# Patient Record
Sex: MALE | Race: BLACK OR AFRICAN AMERICAN | NOT HISPANIC OR LATINO | Employment: OTHER | URBAN - NONMETROPOLITAN AREA
[De-identification: names, ages, dates, MRNs, and addresses within clinical notes are randomized per-mention and may not be internally consistent; named-entity substitution may affect disease eponyms.]

---

## 2017-05-19 ENCOUNTER — APPOINTMENT (OUTPATIENT)
Dept: LAB | Facility: HOSPITAL | Age: 37
End: 2017-05-19

## 2017-05-19 ENCOUNTER — OFFICE VISIT (OUTPATIENT)
Dept: FAMILY MEDICINE CLINIC | Facility: CLINIC | Age: 37
End: 2017-05-19

## 2017-05-19 VITALS
HEIGHT: 73 IN | SYSTOLIC BLOOD PRESSURE: 128 MMHG | HEART RATE: 98 BPM | DIASTOLIC BLOOD PRESSURE: 86 MMHG | WEIGHT: 208.6 LBS | BODY MASS INDEX: 27.65 KG/M2 | TEMPERATURE: 98.1 F | OXYGEN SATURATION: 98 %

## 2017-05-19 DIAGNOSIS — F17.200 TOBACCO DEPENDENCE SYNDROME: Chronic | ICD-10-CM

## 2017-05-19 DIAGNOSIS — R19.7 DIARRHEA, UNSPECIFIED TYPE: ICD-10-CM

## 2017-05-19 DIAGNOSIS — R11.0 NAUSEA: ICD-10-CM

## 2017-05-19 DIAGNOSIS — F20.9 CHRONIC UNDIFFERENTIATED SCHIZOPHRENIA (HCC): Chronic | ICD-10-CM

## 2017-05-19 DIAGNOSIS — F31.9 BIPOLAR AFFECTIVE DISORDER, REMISSION STATUS UNSPECIFIED (HCC): Primary | Chronic | ICD-10-CM

## 2017-05-19 DIAGNOSIS — R10.84 GENERALIZED ABDOMINAL PAIN: ICD-10-CM

## 2017-05-19 LAB
25(OH)D3 SERPL-MCNC: 22 NG/ML (ref 30–100)
ALBUMIN SERPL-MCNC: 4.1 G/DL (ref 3.4–4.8)
ALBUMIN/GLOB SERPL: 1.1 G/DL (ref 1.1–1.8)
ALP SERPL-CCNC: 62 U/L (ref 38–126)
ALT SERPL W P-5'-P-CCNC: 29 U/L (ref 21–72)
AMPHET+METHAMPHET UR QL: NEGATIVE
ANION GAP SERPL CALCULATED.3IONS-SCNC: 8 MMOL/L (ref 5–15)
AST SERPL-CCNC: 40 U/L (ref 17–59)
BARBITURATES UR QL SCN: NEGATIVE
BENZODIAZ UR QL SCN: NEGATIVE
BILIRUB SERPL-MCNC: 0.3 MG/DL (ref 0.2–1.3)
BILIRUB UR QL STRIP: ABNORMAL
BUN BLD-MCNC: 12 MG/DL (ref 7–21)
BUN/CREAT SERPL: 11.2 (ref 7–25)
CALCIUM SPEC-SCNC: 9.6 MG/DL (ref 8.4–10.2)
CANNABINOIDS SERPL QL: NEGATIVE
CHLORIDE SERPL-SCNC: 100 MMOL/L (ref 95–110)
CLARITY UR: CLEAR
CO2 SERPL-SCNC: 29 MMOL/L (ref 22–31)
COCAINE UR QL: NEGATIVE
COLOR UR: ABNORMAL
CREAT BLD-MCNC: 1.07 MG/DL (ref 0.7–1.3)
DEPRECATED RDW RBC AUTO: 48.9 FL (ref 35.1–43.9)
ERYTHROCYTE [DISTWIDTH] IN BLOOD BY AUTOMATED COUNT: 15.3 % (ref 11.5–14.5)
GFR SERPL CREATININE-BSD FRML MDRD: 94 ML/MIN/1.73 (ref 70–162)
GLOBULIN UR ELPH-MCNC: 3.9 GM/DL (ref 2.3–3.5)
GLUCOSE BLD-MCNC: 95 MG/DL (ref 60–100)
GLUCOSE UR STRIP-MCNC: NEGATIVE MG/DL
HAV IGM SERPL QL IA: NEGATIVE
HBV CORE IGM SERPL QL IA: NEGATIVE
HBV SURFACE AG SERPL QL IA: NEGATIVE
HCT VFR BLD AUTO: 39.9 % (ref 39–49)
HCV AB SER DONR QL: NEGATIVE
HGB BLD-MCNC: 13.3 G/DL (ref 13.7–17.3)
HGB UR QL STRIP.AUTO: NEGATIVE
KETONES UR QL STRIP: ABNORMAL
LEUKOCYTE ESTERASE UR QL STRIP.AUTO: NEGATIVE
MAGNESIUM SERPL-MCNC: 2.2 MG/DL (ref 1.6–2.3)
MCH RBC QN AUTO: 29.4 PG (ref 26.5–34)
MCHC RBC AUTO-ENTMCNC: 33.3 G/DL (ref 31.5–36.3)
MCV RBC AUTO: 88.3 FL (ref 80–98)
METHADONE UR QL SCN: NEGATIVE
NITRITE UR QL STRIP: NEGATIVE
OPIATES UR QL: NEGATIVE
OXYCODONE UR QL SCN: NEGATIVE
PH UR STRIP.AUTO: 7 [PH] (ref 5–9)
PLATELET # BLD AUTO: 345 10*3/MM3 (ref 150–450)
PMV BLD AUTO: 9.5 FL (ref 8–12)
POTASSIUM BLD-SCNC: 4.5 MMOL/L (ref 3.5–5.1)
PROT SERPL-MCNC: 8 G/DL (ref 6.3–8.6)
PROT UR QL STRIP: ABNORMAL
RBC # BLD AUTO: 4.52 10*6/MM3 (ref 4.37–5.74)
SODIUM BLD-SCNC: 137 MMOL/L (ref 137–145)
SP GR UR STRIP: 1.03 (ref 1–1.03)
UROBILINOGEN UR QL STRIP: ABNORMAL
WBC NRBC COR # BLD: 13.52 10*3/MM3 (ref 3.2–9.8)

## 2017-05-19 PROCEDURE — 80307 DRUG TEST PRSMV CHEM ANLYZR: CPT | Performed by: FAMILY MEDICINE

## 2017-05-19 PROCEDURE — 81003 URINALYSIS AUTO W/O SCOPE: CPT | Performed by: FAMILY MEDICINE

## 2017-05-19 PROCEDURE — 80053 COMPREHEN METABOLIC PANEL: CPT | Performed by: FAMILY MEDICINE

## 2017-05-19 PROCEDURE — 85027 COMPLETE CBC AUTOMATED: CPT | Performed by: FAMILY MEDICINE

## 2017-05-19 PROCEDURE — 83735 ASSAY OF MAGNESIUM: CPT | Performed by: FAMILY MEDICINE

## 2017-05-19 PROCEDURE — 80074 ACUTE HEPATITIS PANEL: CPT | Performed by: FAMILY MEDICINE

## 2017-05-19 PROCEDURE — 99214 OFFICE O/P EST MOD 30 MIN: CPT | Performed by: FAMILY MEDICINE

## 2017-05-19 PROCEDURE — 36415 COLL VENOUS BLD VENIPUNCTURE: CPT | Performed by: FAMILY MEDICINE

## 2017-05-19 PROCEDURE — 82306 VITAMIN D 25 HYDROXY: CPT | Performed by: FAMILY MEDICINE

## 2017-06-02 ENCOUNTER — OFFICE VISIT (OUTPATIENT)
Dept: FAMILY MEDICINE CLINIC | Facility: CLINIC | Age: 37
End: 2017-06-02

## 2017-06-02 ENCOUNTER — HOSPITAL ENCOUNTER (OUTPATIENT)
Dept: ULTRASOUND IMAGING | Facility: HOSPITAL | Age: 37
Discharge: HOME | End: 2017-06-02
Admitting: FAMILY MEDICINE

## 2017-06-02 VITALS
HEIGHT: 73 IN | BODY MASS INDEX: 27.57 KG/M2 | WEIGHT: 208 LBS | DIASTOLIC BLOOD PRESSURE: 80 MMHG | SYSTOLIC BLOOD PRESSURE: 118 MMHG

## 2017-06-02 DIAGNOSIS — R10.13 DYSPEPSIA: Primary | ICD-10-CM

## 2017-06-02 DIAGNOSIS — R10.84 GENERALIZED ABDOMINAL PAIN: ICD-10-CM

## 2017-06-02 DIAGNOSIS — R11.0 NAUSEA: ICD-10-CM

## 2017-06-02 PROCEDURE — 76705 ECHO EXAM OF ABDOMEN: CPT

## 2017-06-02 PROCEDURE — 99213 OFFICE O/P EST LOW 20 MIN: CPT | Performed by: FAMILY MEDICINE

## 2017-06-02 RX ORDER — PANTOPRAZOLE SODIUM 40 MG/1
40 TABLET, DELAYED RELEASE ORAL DAILY
Qty: 30 TABLET | Refills: 1 | Status: SHIPPED | OUTPATIENT
Start: 2017-06-02 | End: 2018-04-20

## 2017-06-02 NOTE — PROGRESS NOTES
Subjective   Erickson Singh is a 36 y.o. male.     History of Present Illness reevaluation following studies.  All studies including ultrasound normal.  Suspect an element of functional bowel with gastritis plus minus a food intolerance.  Have counseled on cutting back stopping smoking getting back on caffeine normal diet four-week trial of proton pump blocker.  Counseled on same.    The following portions of the patient's history were reviewed and updated as appropriate: allergies, current medications, past family history, past medical history, past social history, past surgical history and problem list.    Review of Systems   Constitutional: Negative for activity change, appetite change, fatigue and unexpected weight change.   HENT: Negative for trouble swallowing and voice change.    Eyes: Negative for redness and visual disturbance.   Respiratory: Negative for cough and wheezing.    Cardiovascular: Negative for chest pain and palpitations.   Gastrointestinal: Negative for abdominal pain, constipation, diarrhea, nausea and vomiting.   Genitourinary: Negative for urgency.   Musculoskeletal: Negative for joint swelling.   Neurological: Negative for syncope and headaches.   Hematological: Negative for adenopathy.   Psychiatric/Behavioral: Negative for sleep disturbance.       Objective   Physical Exam   Constitutional: He appears well-developed.   HENT:   Head: Normocephalic.   Eyes: Pupils are equal, round, and reactive to light.   Neck: Normal range of motion.   Cardiovascular: Normal rate.    Pulmonary/Chest: Effort normal.   Abdominal: Soft.   Musculoskeletal: Normal range of motion.   Neurological: He is alert.   Skin: Skin is warm.   Psychiatric: He has a normal mood and affect.       Assessment/Plan   Erickson was seen today for follow-up.    Diagnoses and all orders for this visit:    Dyspepsia  -     pantoprazole (PROTONIX) 40 MG EC tablet; Take 1 tablet by mouth Daily. x4wks for stomach

## 2017-07-12 ENCOUNTER — TRANSCRIBE ORDERS (OUTPATIENT)
Dept: LAB | Facility: HOSPITAL | Age: 37
End: 2017-07-12

## 2017-07-12 DIAGNOSIS — F20.9 UNSPECIFIED SCHIZOPHRENIA, SUBCHRONIC CONDITION: Primary | ICD-10-CM

## 2017-07-13 ENCOUNTER — LAB (OUTPATIENT)
Dept: LAB | Facility: HOSPITAL | Age: 37
End: 2017-07-13

## 2017-07-13 DIAGNOSIS — F20.9 UNSPECIFIED SCHIZOPHRENIA, SUBCHRONIC CONDITION: ICD-10-CM

## 2017-07-13 LAB
ALBUMIN SERPL-MCNC: 3.9 G/DL (ref 3.4–4.8)
ALBUMIN/GLOB SERPL: 1.2 G/DL (ref 1.1–1.8)
ALP SERPL-CCNC: 67 U/L (ref 38–126)
ALT SERPL W P-5'-P-CCNC: 33 U/L (ref 21–72)
ANION GAP SERPL CALCULATED.3IONS-SCNC: 9 MMOL/L (ref 5–15)
AST SERPL-CCNC: 23 U/L (ref 17–59)
BASOPHILS # BLD AUTO: 0.02 10*3/MM3 (ref 0–0.2)
BASOPHILS NFR BLD AUTO: 0.2 % (ref 0–2)
BILIRUB SERPL-MCNC: 0.3 MG/DL (ref 0.2–1.3)
BUN BLD-MCNC: 12 MG/DL (ref 7–21)
BUN/CREAT SERPL: 10.6 (ref 7–25)
CALCIUM SPEC-SCNC: 9.3 MG/DL (ref 8.4–10.2)
CHLORIDE SERPL-SCNC: 103 MMOL/L (ref 95–110)
CO2 SERPL-SCNC: 27 MMOL/L (ref 22–31)
CREAT BLD-MCNC: 1.13 MG/DL (ref 0.7–1.3)
DEPRECATED RDW RBC AUTO: 50.8 FL (ref 35.1–43.9)
EOSINOPHIL # BLD AUTO: 0.18 10*3/MM3 (ref 0–0.7)
EOSINOPHIL NFR BLD AUTO: 2 % (ref 0–7)
ERYTHROCYTE [DISTWIDTH] IN BLOOD BY AUTOMATED COUNT: 15.2 % (ref 11.5–14.5)
GFR SERPL CREATININE-BSD FRML MDRD: 88 ML/MIN/1.73 (ref 70–162)
GLOBULIN UR ELPH-MCNC: 3.2 GM/DL (ref 2.3–3.5)
GLUCOSE BLD-MCNC: 86 MG/DL (ref 60–100)
HCT VFR BLD AUTO: 40.9 % (ref 39–49)
HGB BLD-MCNC: 13.1 G/DL (ref 13.7–17.3)
IMM GRANULOCYTES # BLD: 0.04 10*3/MM3 (ref 0–0.02)
IMM GRANULOCYTES NFR BLD: 0.4 % (ref 0–0.5)
LYMPHOCYTES # BLD AUTO: 2.48 10*3/MM3 (ref 0.6–4.2)
LYMPHOCYTES NFR BLD AUTO: 27.4 % (ref 10–50)
MCH RBC QN AUTO: 29.2 PG (ref 26.5–34)
MCHC RBC AUTO-ENTMCNC: 32 G/DL (ref 31.5–36.3)
MCV RBC AUTO: 91.3 FL (ref 80–98)
MONOCYTES # BLD AUTO: 1.22 10*3/MM3 (ref 0–0.9)
MONOCYTES NFR BLD AUTO: 13.5 % (ref 0–12)
NEUTROPHILS # BLD AUTO: 5.11 10*3/MM3 (ref 2–8.6)
NEUTROPHILS NFR BLD AUTO: 56.5 % (ref 37–80)
PLATELET # BLD AUTO: 249 10*3/MM3 (ref 150–450)
PMV BLD AUTO: 10.1 FL (ref 8–12)
POTASSIUM BLD-SCNC: 4.9 MMOL/L (ref 3.5–5.1)
PROT SERPL-MCNC: 7.1 G/DL (ref 6.3–8.6)
RBC # BLD AUTO: 4.48 10*6/MM3 (ref 4.37–5.74)
SODIUM BLD-SCNC: 139 MMOL/L (ref 137–145)
T3 SERPL-MCNC: 124 NG/DL (ref 97–169)
T4 FREE SERPL-MCNC: 0.95 NG/DL (ref 0.78–2.19)
TSH SERPL DL<=0.05 MIU/L-ACNC: 2.41 MIU/ML (ref 0.46–4.68)
VALPROATE SERPL-MCNC: 120.9 MCG/ML (ref 50–120)
WBC NRBC COR # BLD: 9.05 10*3/MM3 (ref 3.2–9.8)

## 2017-07-13 PROCEDURE — 80053 COMPREHEN METABOLIC PANEL: CPT | Performed by: NURSE PRACTITIONER

## 2017-07-13 PROCEDURE — 84480 ASSAY TRIIODOTHYRONINE (T3): CPT | Performed by: NURSE PRACTITIONER

## 2017-07-13 PROCEDURE — 84439 ASSAY OF FREE THYROXINE: CPT | Performed by: NURSE PRACTITIONER

## 2017-07-13 PROCEDURE — 84443 ASSAY THYROID STIM HORMONE: CPT | Performed by: NURSE PRACTITIONER

## 2017-07-13 PROCEDURE — 85025 COMPLETE CBC W/AUTO DIFF WBC: CPT | Performed by: NURSE PRACTITIONER

## 2017-07-13 PROCEDURE — 36415 COLL VENOUS BLD VENIPUNCTURE: CPT

## 2017-07-13 PROCEDURE — 80164 ASSAY DIPROPYLACETIC ACD TOT: CPT | Performed by: NURSE PRACTITIONER

## 2017-09-12 ENCOUNTER — LAB (OUTPATIENT)
Dept: LAB | Facility: HOSPITAL | Age: 37
End: 2017-09-12

## 2017-09-12 ENCOUNTER — TRANSCRIBE ORDERS (OUTPATIENT)
Dept: LAB | Facility: HOSPITAL | Age: 37
End: 2017-09-12

## 2017-09-12 DIAGNOSIS — Z79.899 ENCOUNTER FOR LONG-TERM (CURRENT) USE OF MEDICATIONS: Primary | ICD-10-CM

## 2017-09-12 DIAGNOSIS — Z79.899 ENCOUNTER FOR LONG-TERM (CURRENT) USE OF MEDICATIONS: ICD-10-CM

## 2017-09-12 LAB
25(OH)D3 SERPL-MCNC: 28.5 NG/ML (ref 30–100)
ALBUMIN SERPL-MCNC: 4.2 G/DL (ref 3.4–4.8)
ALBUMIN/GLOB SERPL: 1.3 G/DL (ref 1.1–1.8)
ALP SERPL-CCNC: 60 U/L (ref 38–126)
ALT SERPL W P-5'-P-CCNC: 39 U/L (ref 21–72)
AMPHET+METHAMPHET UR QL: NEGATIVE
ANION GAP SERPL CALCULATED.3IONS-SCNC: 9 MMOL/L (ref 5–15)
AST SERPL-CCNC: 40 U/L (ref 17–59)
BARBITURATES UR QL SCN: NEGATIVE
BASOPHILS # BLD AUTO: 0.02 10*3/MM3 (ref 0–0.2)
BASOPHILS NFR BLD AUTO: 0.2 % (ref 0–2)
BENZODIAZ UR QL SCN: NEGATIVE
BILIRUB SERPL-MCNC: 0.4 MG/DL (ref 0.2–1.3)
BUN BLD-MCNC: 8 MG/DL (ref 7–21)
BUN/CREAT SERPL: 7.6 (ref 7–25)
CALCIUM SPEC-SCNC: 9.6 MG/DL (ref 8.4–10.2)
CANNABINOIDS SERPL QL: NEGATIVE
CHLORIDE SERPL-SCNC: 103 MMOL/L (ref 95–110)
CO2 SERPL-SCNC: 26 MMOL/L (ref 22–31)
COCAINE UR QL: NEGATIVE
CREAT BLD-MCNC: 1.05 MG/DL (ref 0.7–1.3)
DEPRECATED RDW RBC AUTO: 48.5 FL (ref 35.1–43.9)
EOSINOPHIL # BLD AUTO: 0.14 10*3/MM3 (ref 0–0.7)
EOSINOPHIL NFR BLD AUTO: 1.6 % (ref 0–7)
ERYTHROCYTE [DISTWIDTH] IN BLOOD BY AUTOMATED COUNT: 14.9 % (ref 11.5–14.5)
GFR SERPL CREATININE-BSD FRML MDRD: 96 ML/MIN/1.73 (ref 70–162)
GLOBULIN UR ELPH-MCNC: 3.2 GM/DL (ref 2.3–3.5)
GLUCOSE BLD-MCNC: 107 MG/DL (ref 60–100)
HCT VFR BLD AUTO: 41.7 % (ref 39–49)
HGB BLD-MCNC: 14.3 G/DL (ref 13.7–17.3)
IMM GRANULOCYTES # BLD: 0.04 10*3/MM3 (ref 0–0.02)
IMM GRANULOCYTES NFR BLD: 0.5 % (ref 0–0.5)
LYMPHOCYTES # BLD AUTO: 2.12 10*3/MM3 (ref 0.6–4.2)
LYMPHOCYTES NFR BLD AUTO: 23.9 % (ref 10–50)
MCH RBC QN AUTO: 30.4 PG (ref 26.5–34)
MCHC RBC AUTO-ENTMCNC: 34.3 G/DL (ref 31.5–36.3)
MCV RBC AUTO: 88.5 FL (ref 80–98)
METHADONE UR QL SCN: NEGATIVE
MONOCYTES # BLD AUTO: 1.08 10*3/MM3 (ref 0–0.9)
MONOCYTES NFR BLD AUTO: 12.2 % (ref 0–12)
NEUTROPHILS # BLD AUTO: 5.47 10*3/MM3 (ref 2–8.6)
NEUTROPHILS NFR BLD AUTO: 61.6 % (ref 37–80)
NRBC BLD MANUAL-RTO: 0 /100 WBC (ref 0–0)
OPIATES UR QL: NEGATIVE
OXYCODONE UR QL SCN: NEGATIVE
PLATELET # BLD AUTO: 302 10*3/MM3 (ref 150–450)
PMV BLD AUTO: 9.9 FL (ref 8–12)
POTASSIUM BLD-SCNC: 4.2 MMOL/L (ref 3.5–5.1)
PROT SERPL-MCNC: 7.4 G/DL (ref 6.3–8.6)
RBC # BLD AUTO: 4.71 10*6/MM3 (ref 4.37–5.74)
SODIUM BLD-SCNC: 138 MMOL/L (ref 137–145)
T4 FREE SERPL-MCNC: 1 NG/DL (ref 0.78–2.19)
TSH SERPL DL<=0.05 MIU/L-ACNC: 2.22 MIU/ML (ref 0.46–4.68)
VALPROATE SERPL-MCNC: 76.4 MCG/ML (ref 50–120)
VIT B12 BLD-MCNC: 820 PG/ML (ref 239–931)
WBC NRBC COR # BLD: 8.87 10*3/MM3 (ref 3.2–9.8)

## 2017-09-12 PROCEDURE — 36415 COLL VENOUS BLD VENIPUNCTURE: CPT

## 2017-09-12 PROCEDURE — 80307 DRUG TEST PRSMV CHEM ANLYZR: CPT | Performed by: NURSE PRACTITIONER

## 2017-09-12 PROCEDURE — 85025 COMPLETE CBC W/AUTO DIFF WBC: CPT | Performed by: NURSE PRACTITIONER

## 2017-09-12 PROCEDURE — 82306 VITAMIN D 25 HYDROXY: CPT | Performed by: NURSE PRACTITIONER

## 2017-09-12 PROCEDURE — 84481 FREE ASSAY (FT-3): CPT | Performed by: NURSE PRACTITIONER

## 2017-09-12 PROCEDURE — 80053 COMPREHEN METABOLIC PANEL: CPT | Performed by: NURSE PRACTITIONER

## 2017-09-12 PROCEDURE — 84443 ASSAY THYROID STIM HORMONE: CPT | Performed by: NURSE PRACTITIONER

## 2017-09-12 PROCEDURE — 84439 ASSAY OF FREE THYROXINE: CPT | Performed by: NURSE PRACTITIONER

## 2017-09-12 PROCEDURE — 82607 VITAMIN B-12: CPT | Performed by: NURSE PRACTITIONER

## 2017-09-12 PROCEDURE — 80164 ASSAY DIPROPYLACETIC ACD TOT: CPT | Performed by: NURSE PRACTITIONER

## 2017-09-13 LAB — T3FREE SERPL-MCNC: 3.4 PG/ML (ref 2–4.4)

## 2018-02-16 ENCOUNTER — TRANSCRIBE ORDERS (OUTPATIENT)
Dept: LAB | Facility: HOSPITAL | Age: 38
End: 2018-02-16

## 2018-02-16 ENCOUNTER — LAB (OUTPATIENT)
Dept: LAB | Facility: HOSPITAL | Age: 38
End: 2018-02-16

## 2018-02-16 DIAGNOSIS — F20.9 CHRONIC SCHIZOPHRENIA (HCC): ICD-10-CM

## 2018-02-16 DIAGNOSIS — F20.9 CHRONIC SCHIZOPHRENIA (HCC): Primary | ICD-10-CM

## 2018-02-16 LAB
25(OH)D3 SERPL-MCNC: 23.8 NG/ML (ref 30–100)
ALBUMIN SERPL-MCNC: 3.9 G/DL (ref 3.4–4.8)
ALBUMIN/GLOB SERPL: 1 G/DL (ref 1.1–1.8)
ALP SERPL-CCNC: 56 U/L (ref 38–126)
ALT SERPL W P-5'-P-CCNC: 23 U/L (ref 21–72)
ANION GAP SERPL CALCULATED.3IONS-SCNC: 12 MMOL/L (ref 5–15)
AST SERPL-CCNC: 40 U/L (ref 17–59)
BASOPHILS # BLD AUTO: 0.04 10*3/MM3 (ref 0–0.2)
BASOPHILS NFR BLD AUTO: 0.4 % (ref 0–2)
BILIRUB SERPL-MCNC: 0.2 MG/DL (ref 0.2–1.3)
BUN BLD-MCNC: 12 MG/DL (ref 7–21)
BUN/CREAT SERPL: 12.1 (ref 7–25)
CALCIUM SPEC-SCNC: 9.6 MG/DL (ref 8.4–10.2)
CHLORIDE SERPL-SCNC: 101 MMOL/L (ref 95–110)
CO2 SERPL-SCNC: 30 MMOL/L (ref 22–31)
CREAT BLD-MCNC: 0.99 MG/DL (ref 0.7–1.3)
DEPRECATED RDW RBC AUTO: 46.7 FL (ref 35.1–43.9)
EOSINOPHIL # BLD AUTO: 0.08 10*3/MM3 (ref 0–0.7)
EOSINOPHIL NFR BLD AUTO: 0.9 % (ref 0–7)
ERYTHROCYTE [DISTWIDTH] IN BLOOD BY AUTOMATED COUNT: 14.1 % (ref 11.5–14.5)
GFR SERPL CREATININE-BSD FRML MDRD: 103 ML/MIN/1.73 (ref 70–162)
GLOBULIN UR ELPH-MCNC: 3.8 GM/DL (ref 2.3–3.5)
GLUCOSE BLD-MCNC: 98 MG/DL (ref 60–100)
HCT VFR BLD AUTO: 40.6 % (ref 39–49)
HGB BLD-MCNC: 13.5 G/DL (ref 13.7–17.3)
IMM GRANULOCYTES # BLD: 0.03 10*3/MM3 (ref 0–0.02)
IMM GRANULOCYTES NFR BLD: 0.3 % (ref 0–0.5)
LYMPHOCYTES # BLD AUTO: 1.83 10*3/MM3 (ref 0.6–4.2)
LYMPHOCYTES NFR BLD AUTO: 19.9 % (ref 10–50)
MCH RBC QN AUTO: 29.7 PG (ref 26.5–34)
MCHC RBC AUTO-ENTMCNC: 33.3 G/DL (ref 31.5–36.3)
MCV RBC AUTO: 89.2 FL (ref 80–98)
MONOCYTES # BLD AUTO: 1.11 10*3/MM3 (ref 0–0.9)
MONOCYTES NFR BLD AUTO: 12.1 % (ref 0–12)
NEUTROPHILS # BLD AUTO: 6.11 10*3/MM3 (ref 2–8.6)
NEUTROPHILS NFR BLD AUTO: 66.4 % (ref 37–80)
PLATELET # BLD AUTO: 267 10*3/MM3 (ref 150–450)
PMV BLD AUTO: 9.9 FL (ref 8–12)
POTASSIUM BLD-SCNC: 5.1 MMOL/L (ref 3.5–5.1)
PROT SERPL-MCNC: 7.7 G/DL (ref 6.3–8.6)
RBC # BLD AUTO: 4.55 10*6/MM3 (ref 4.37–5.74)
SODIUM BLD-SCNC: 143 MMOL/L (ref 137–145)
T3 SERPL-MCNC: 138 NG/DL (ref 97–169)
T4 FREE SERPL-MCNC: 1.06 NG/DL (ref 0.78–2.19)
TSH SERPL DL<=0.05 MIU/L-ACNC: 1.92 MIU/ML (ref 0.46–4.68)
VALPROATE SERPL-MCNC: 64.7 MCG/ML (ref 50–120)
WBC NRBC COR # BLD: 9.2 10*3/MM3 (ref 3.2–9.8)

## 2018-02-16 PROCEDURE — 85025 COMPLETE CBC W/AUTO DIFF WBC: CPT

## 2018-02-16 PROCEDURE — 84439 ASSAY OF FREE THYROXINE: CPT

## 2018-02-16 PROCEDURE — 84480 ASSAY TRIIODOTHYRONINE (T3): CPT

## 2018-02-16 PROCEDURE — 82306 VITAMIN D 25 HYDROXY: CPT

## 2018-02-16 PROCEDURE — 80164 ASSAY DIPROPYLACETIC ACD TOT: CPT

## 2018-02-16 PROCEDURE — 84443 ASSAY THYROID STIM HORMONE: CPT

## 2018-02-16 PROCEDURE — 80053 COMPREHEN METABOLIC PANEL: CPT

## 2018-02-16 PROCEDURE — 36415 COLL VENOUS BLD VENIPUNCTURE: CPT

## 2018-04-20 ENCOUNTER — OFFICE VISIT (OUTPATIENT)
Dept: FAMILY MEDICINE CLINIC | Facility: CLINIC | Age: 38
End: 2018-04-20

## 2018-04-20 VITALS
WEIGHT: 205 LBS | DIASTOLIC BLOOD PRESSURE: 78 MMHG | SYSTOLIC BLOOD PRESSURE: 120 MMHG | HEIGHT: 73 IN | BODY MASS INDEX: 27.17 KG/M2

## 2018-04-20 DIAGNOSIS — F20.9 CHRONIC UNDIFFERENTIATED SCHIZOPHRENIA (HCC): Primary | Chronic | ICD-10-CM

## 2018-04-20 DIAGNOSIS — F17.200 TOBACCO DEPENDENCE SYNDROME: Chronic | ICD-10-CM

## 2018-04-20 DIAGNOSIS — Z87.820 SIGNIFICANT CLOSED HEAD TRAUMA WITHIN PAST 3 MONTHS: ICD-10-CM

## 2018-04-20 DIAGNOSIS — F31.9 BIPOLAR AFFECTIVE DISORDER, REMISSION STATUS UNSPECIFIED (HCC): Chronic | ICD-10-CM

## 2018-04-20 PROCEDURE — 99214 OFFICE O/P EST MOD 30 MIN: CPT | Performed by: FAMILY MEDICINE

## 2018-04-20 RX ORDER — PENICILLIN V POTASSIUM 500 MG/1
500 TABLET ORAL
Refills: 0 | COMMUNITY
Start: 2018-04-08 | End: 2018-06-22

## 2018-04-20 NOTE — PROGRESS NOTES
Subjective   Erickson Singh is a 37 y.o. male.     History of Present Illness   other request evaluation of status post head trauma.  Mother states patient fell about 3 weeks ago striking the back of his head.  As patient states been more lethargic since no seizure activity over no real loss of consciousness.  Given patient's schizophrenia is difficult to get a straight history from patient.  Has had recent lab work done that was normal however.  Previous history noted.    The following portions of the patient's history were reviewed and updated as appropriate: allergies, current medications, past family history, past medical history, past social history, past surgical history and problem list.    Review of Systems   Constitutional: Positive for fatigue. Negative for activity change, appetite change and unexpected weight change.   HENT: Negative for trouble swallowing and voice change.    Eyes: Negative for redness and visual disturbance.   Respiratory: Negative for cough and wheezing.    Cardiovascular: Negative for chest pain and palpitations.   Gastrointestinal: Negative for abdominal pain, constipation, diarrhea, nausea and vomiting.   Genitourinary: Negative for urgency.   Musculoskeletal: Negative for joint swelling.   Neurological: Negative for syncope and headaches.   Hematological: Negative for adenopathy.   Psychiatric/Behavioral: Positive for decreased concentration and sleep disturbance.       Objective   Physical Exam   Constitutional: He appears well-developed.   HENT:   Head: Normocephalic and atraumatic.   Right Ear: External ear normal.   Left Ear: External ear normal.   Nose: Nose normal.   Mouth/Throat: Oropharynx is clear and moist.   Eyes: Pupils are equal, round, and reactive to light.   Neck: Normal range of motion. Neck supple.   Cardiovascular: Normal rate and regular rhythm.    Pulmonary/Chest: Effort normal.   Abdominal: Soft. Bowel sounds are normal.   Musculoskeletal: Normal  range of motion.   Neurological: He is alert. He has normal reflexes. He displays normal reflexes. No cranial nerve deficit. He exhibits normal muscle tone. Coordination normal.   Skin: Skin is warm and dry.   Psychiatric: His affect is blunt. His speech is delayed. He is slowed and withdrawn. Cognition and memory are impaired.       Assessment/Plan   Erickson was seen today for thyroid problem.    Diagnoses and all orders for this visit:    Chronic undifferentiated schizophrenia    Bipolar affective disorder, remission status unspecified    Tobacco dependence syndrome    Significant closed head trauma within past 3 months  -     CT Head Without Contrast; Future        Suspect grade 1 to concussion.  Unfortunately his symptoms are also masked by his psychotropic medications.  Mother states some element of possible sedation from same and referred back to mental health professional for same.  Given questionable history we'll get a CT scan just to look for any hidden trauma otherwise counseled on preventative measures recheck as directed

## 2018-05-04 ENCOUNTER — HOSPITAL ENCOUNTER (OUTPATIENT)
Dept: CT IMAGING | Facility: HOSPITAL | Age: 38
Discharge: HOME | End: 2018-05-04
Admitting: FAMILY MEDICINE

## 2018-05-04 DIAGNOSIS — Z87.820 SIGNIFICANT CLOSED HEAD TRAUMA WITHIN PAST 3 MONTHS: ICD-10-CM

## 2018-05-04 PROCEDURE — 70450 CT HEAD/BRAIN W/O DYE: CPT

## 2018-06-06 ENCOUNTER — APPOINTMENT (OUTPATIENT)
Dept: GENERAL RADIOLOGY | Facility: HOSPITAL | Age: 38
End: 2018-06-06

## 2018-06-06 ENCOUNTER — HOSPITAL ENCOUNTER (EMERGENCY)
Facility: HOSPITAL | Age: 38
Discharge: HOME | End: 2018-06-07
Attending: EMERGENCY MEDICINE | Admitting: EMERGENCY MEDICINE

## 2018-06-06 VITALS
HEIGHT: 74 IN | BODY MASS INDEX: 25.67 KG/M2 | HEART RATE: 60 BPM | OXYGEN SATURATION: 99 % | TEMPERATURE: 99.2 F | DIASTOLIC BLOOD PRESSURE: 83 MMHG | RESPIRATION RATE: 18 BRPM | WEIGHT: 200 LBS | SYSTOLIC BLOOD PRESSURE: 130 MMHG

## 2018-06-06 DIAGNOSIS — R10.9 ABDOMINAL PAIN, UNSPECIFIED ABDOMINAL LOCATION: Primary | ICD-10-CM

## 2018-06-06 DIAGNOSIS — R11.0 NAUSEA: ICD-10-CM

## 2018-06-06 LAB
ALBUMIN SERPL-MCNC: 4.1 G/DL (ref 3.4–4.8)
ALBUMIN/GLOB SERPL: 1.3 G/DL (ref 1.1–1.8)
ALP SERPL-CCNC: 54 U/L (ref 38–126)
ALT SERPL W P-5'-P-CCNC: 26 U/L (ref 21–72)
AMPHET+METHAMPHET UR QL: NEGATIVE
ANION GAP SERPL CALCULATED.3IONS-SCNC: 9 MMOL/L (ref 5–15)
AST SERPL-CCNC: 25 U/L (ref 17–59)
BACTERIA UR QL AUTO: ABNORMAL /HPF
BARBITURATES UR QL SCN: NEGATIVE
BASOPHILS # BLD AUTO: 0.02 10*3/MM3 (ref 0–0.2)
BASOPHILS NFR BLD AUTO: 0.2 % (ref 0–2)
BENZODIAZ UR QL SCN: NEGATIVE
BILIRUB SERPL-MCNC: 0.3 MG/DL (ref 0.2–1.3)
BILIRUB UR QL STRIP: ABNORMAL
BUN BLD-MCNC: 11 MG/DL (ref 7–21)
BUN/CREAT SERPL: 10.7 (ref 7–25)
CALCIUM SPEC-SCNC: 9.7 MG/DL (ref 8.4–10.2)
CANNABINOIDS SERPL QL: NEGATIVE
CHLORIDE SERPL-SCNC: 101 MMOL/L (ref 95–110)
CLARITY UR: CLEAR
CO2 SERPL-SCNC: 28 MMOL/L (ref 22–31)
COCAINE UR QL: NEGATIVE
COLOR UR: YELLOW
CREAT BLD-MCNC: 1.03 MG/DL (ref 0.7–1.3)
DEPRECATED RDW RBC AUTO: 45.3 FL (ref 35.1–43.9)
EOSINOPHIL # BLD AUTO: 0.07 10*3/MM3 (ref 0–0.7)
EOSINOPHIL NFR BLD AUTO: 0.7 % (ref 0–7)
ERYTHROCYTE [DISTWIDTH] IN BLOOD BY AUTOMATED COUNT: 14.1 % (ref 11.5–14.5)
GFR SERPL CREATININE-BSD FRML MDRD: 98 ML/MIN/1.73 (ref 70–162)
GLOBULIN UR ELPH-MCNC: 3.2 GM/DL (ref 2.3–3.5)
GLUCOSE BLD-MCNC: 97 MG/DL (ref 60–100)
GLUCOSE UR STRIP-MCNC: NEGATIVE MG/DL
HCT VFR BLD AUTO: 39 % (ref 39–49)
HGB BLD-MCNC: 13.1 G/DL (ref 13.7–17.3)
HGB UR QL STRIP.AUTO: NEGATIVE
HOLD SPECIMEN: NORMAL
HOLD SPECIMEN: NORMAL
HYALINE CASTS UR QL AUTO: ABNORMAL /LPF
IMM GRANULOCYTES # BLD: 0.04 10*3/MM3 (ref 0–0.02)
IMM GRANULOCYTES NFR BLD: 0.4 % (ref 0–0.5)
KETONES UR QL STRIP: ABNORMAL
LEUKOCYTE ESTERASE UR QL STRIP.AUTO: NEGATIVE
LIPASE SERPL-CCNC: 128 U/L (ref 23–300)
LYMPHOCYTES # BLD AUTO: 2.72 10*3/MM3 (ref 0.6–4.2)
LYMPHOCYTES NFR BLD AUTO: 26.5 % (ref 10–50)
MCH RBC QN AUTO: 29.4 PG (ref 26.5–34)
MCHC RBC AUTO-ENTMCNC: 33.6 G/DL (ref 31.5–36.3)
MCV RBC AUTO: 87.6 FL (ref 80–98)
METHADONE UR QL SCN: NEGATIVE
MONOCYTES # BLD AUTO: 1.34 10*3/MM3 (ref 0–0.9)
MONOCYTES NFR BLD AUTO: 13.1 % (ref 0–12)
MUCOUS THREADS URNS QL MICRO: ABNORMAL /HPF
NEUTROPHILS # BLD AUTO: 6.06 10*3/MM3 (ref 2–8.6)
NEUTROPHILS NFR BLD AUTO: 59.1 % (ref 37–80)
NITRITE UR QL STRIP: NEGATIVE
OPIATES UR QL: NEGATIVE
OXYCODONE UR QL SCN: NEGATIVE
PH UR STRIP.AUTO: 7 [PH] (ref 5–9)
PLATELET # BLD AUTO: 267 10*3/MM3 (ref 150–450)
PMV BLD AUTO: 9 FL (ref 8–12)
POTASSIUM BLD-SCNC: 4.3 MMOL/L (ref 3.5–5.1)
PROT SERPL-MCNC: 7.3 G/DL (ref 6.3–8.6)
PROT UR QL STRIP: ABNORMAL
RBC # BLD AUTO: 4.45 10*6/MM3 (ref 4.37–5.74)
RBC # UR: ABNORMAL /HPF
REF LAB TEST METHOD: ABNORMAL
SODIUM BLD-SCNC: 138 MMOL/L (ref 137–145)
SP GR UR STRIP: 1.02 (ref 1–1.03)
SQUAMOUS #/AREA URNS HPF: ABNORMAL /HPF
UROBILINOGEN UR QL STRIP: ABNORMAL
VALPROATE SERPL-MCNC: 22.3 MCG/ML (ref 50–120)
WBC NRBC COR # BLD: 10.25 10*3/MM3 (ref 3.2–9.8)
WBC UR QL AUTO: ABNORMAL /HPF
WHOLE BLOOD HOLD SPECIMEN: NORMAL
WHOLE BLOOD HOLD SPECIMEN: NORMAL

## 2018-06-06 PROCEDURE — 85025 COMPLETE CBC W/AUTO DIFF WBC: CPT | Performed by: EMERGENCY MEDICINE

## 2018-06-06 PROCEDURE — 80307 DRUG TEST PRSMV CHEM ANLYZR: CPT | Performed by: EMERGENCY MEDICINE

## 2018-06-06 PROCEDURE — 83690 ASSAY OF LIPASE: CPT | Performed by: EMERGENCY MEDICINE

## 2018-06-06 PROCEDURE — 25010000002 KETOROLAC TROMETHAMINE PER 15 MG: Performed by: EMERGENCY MEDICINE

## 2018-06-06 PROCEDURE — 99284 EMERGENCY DEPT VISIT MOD MDM: CPT

## 2018-06-06 PROCEDURE — 25010000002 ONDANSETRON PER 1 MG: Performed by: EMERGENCY MEDICINE

## 2018-06-06 PROCEDURE — 81001 URINALYSIS AUTO W/SCOPE: CPT | Performed by: EMERGENCY MEDICINE

## 2018-06-06 PROCEDURE — 80053 COMPREHEN METABOLIC PANEL: CPT | Performed by: EMERGENCY MEDICINE

## 2018-06-06 PROCEDURE — 96375 TX/PRO/DX INJ NEW DRUG ADDON: CPT

## 2018-06-06 PROCEDURE — 80164 ASSAY DIPROPYLACETIC ACD TOT: CPT | Performed by: EMERGENCY MEDICINE

## 2018-06-06 PROCEDURE — 74022 RADEX COMPL AQT ABD SERIES: CPT

## 2018-06-06 PROCEDURE — 96374 THER/PROPH/DIAG INJ IV PUSH: CPT

## 2018-06-06 PROCEDURE — 96361 HYDRATE IV INFUSION ADD-ON: CPT

## 2018-06-06 RX ORDER — KETOROLAC TROMETHAMINE 30 MG/ML
30 INJECTION, SOLUTION INTRAMUSCULAR; INTRAVENOUS ONCE
Status: COMPLETED | OUTPATIENT
Start: 2018-06-06 | End: 2018-06-06

## 2018-06-06 RX ORDER — ONDANSETRON 4 MG/1
4 TABLET, ORALLY DISINTEGRATING ORAL EVERY 6 HOURS PRN
Qty: 12 TABLET | Refills: 0 | Status: SHIPPED | OUTPATIENT
Start: 2018-06-06 | End: 2018-06-22

## 2018-06-06 RX ORDER — ONDANSETRON 2 MG/ML
4 INJECTION INTRAMUSCULAR; INTRAVENOUS ONCE
Status: COMPLETED | OUTPATIENT
Start: 2018-06-06 | End: 2018-06-06

## 2018-06-06 RX ORDER — SODIUM CHLORIDE 9 MG/ML
125 INJECTION, SOLUTION INTRAVENOUS CONTINUOUS
Status: DISCONTINUED | OUTPATIENT
Start: 2018-06-06 | End: 2018-06-07 | Stop reason: HOSPADM

## 2018-06-06 RX ORDER — SODIUM CHLORIDE 0.9 % (FLUSH) 0.9 %
10 SYRINGE (ML) INJECTION AS NEEDED
Status: DISCONTINUED | OUTPATIENT
Start: 2018-06-06 | End: 2018-06-07 | Stop reason: HOSPADM

## 2018-06-06 RX ADMIN — SODIUM CHLORIDE 125 ML/HR: 900 INJECTION, SOLUTION INTRAVENOUS at 21:37

## 2018-06-06 RX ADMIN — SODIUM CHLORIDE 1000 ML: 900 INJECTION, SOLUTION INTRAVENOUS at 20:40

## 2018-06-06 RX ADMIN — Medication 10 ML: at 20:40

## 2018-06-06 RX ADMIN — KETOROLAC TROMETHAMINE 30 MG: 30 INJECTION, SOLUTION INTRAMUSCULAR; INTRAVENOUS at 20:40

## 2018-06-06 RX ADMIN — ONDANSETRON 4 MG: 2 INJECTION INTRAMUSCULAR; INTRAVENOUS at 20:40

## 2018-06-07 NOTE — ED PROVIDER NOTES
Subjective   Mr. Singh presents with a several month history of nausea and abdominal discomfort.  Patient has had no follow-up for this.  Patient states that he has seen his psychiatric doctor in the last 3 months and his medications have been changed a bit, lowering his Depakote by one pill a day.  Patient states nausea and diarrhea are worse when he has fast food.  He complains of generalized aches.  This is evidently worse last week per the mother at the bedside who gives the majority of the history as the patient is very unhelpful Miss regard.  She states that she was giving him Pepto-Bismol as well as [place] and by Sunday he had felt better but has gotten worse once again this week.  Patient also complaining of generalized body aches during this time.            Review of Systems   Constitutional: Positive for activity change and appetite change. Negative for chills and fever.   HENT: Negative.  Negative for congestion.    Eyes: Negative.  Negative for photophobia and visual disturbance.   Respiratory: Negative.  Negative for cough, chest tightness and shortness of breath.    Cardiovascular: Negative.  Negative for chest pain and palpitations.   Gastrointestinal: Positive for abdominal pain, diarrhea and nausea. Negative for constipation and vomiting.   Endocrine: Negative.    Genitourinary: Negative.  Negative for decreased urine volume, dysuria, flank pain and hematuria.   Musculoskeletal: Negative.  Negative for arthralgias, back pain, myalgias, neck pain and neck stiffness.   Skin: Negative.  Negative for pallor.   Neurological: Negative.  Negative for dizziness, syncope, weakness, light-headedness, numbness and headaches.   Psychiatric/Behavioral: Positive for agitation. Negative for confusion and suicidal ideas. The patient is not nervous/anxious.        Past Medical History:   Diagnosis Date   • Anorectal fistula     fistula in ano   • Bipolar disorder    • Chronic undifferentiated schizophrenia     • Dental abscess    • Dental caries    • External hordeolum    • Facial swelling    • On long term drug therapy    • Rectal discharge    • Tachycardia    • Tobacco dependence syndrome    • Vitamin deficiency        No Known Allergies    Past Surgical History:   Procedure Laterality Date   • ANAL FISTULA REPAIR  03/07/2014    Anal fistula, left side, 5 cm from the anus. A left sided fistula-in-ano with connection to the posterior midline of the anus.   • INJECTION OF MEDICATION  02/05/2013    Rocephin (1)      • INJECTION OF MEDICATION  02/05/2013    Toradol (1)          Family History   Problem Relation Age of Onset   • Cancer Other    • Diabetes Other    • Hypertension Other    • Stroke Other    • Thyroid disease Other        Social History     Social History   • Marital status: Single     Social History Main Topics   • Smoking status: Current Every Day Smoker   • Smokeless tobacco: Never Used   • Alcohol use No   • Drug use: Unknown     Other Topics Concern   • Not on file           Objective   Physical Exam   Constitutional: He is oriented to person, place, and time. He appears well-developed and well-nourished. No distress.   Patient appears nontoxic, normal vital signs, uncommunicative, in no acute distress.   HENT:   Head: Normocephalic and atraumatic.   Eyes: Conjunctivae and EOM are normal.   Neck: Normal range of motion. Neck supple. No JVD present.   Cardiovascular: Normal rate, regular rhythm, normal heart sounds and intact distal pulses.  Exam reveals no gallop and no friction rub.    No murmur heard.  Pulmonary/Chest: Effort normal. No respiratory distress. He has no wheezes. He has no rales. He exhibits no tenderness.   Abdominal: Soft. Bowel sounds are normal. He exhibits no distension and no mass. There is no tenderness. There is no rebound and no guarding.   Nontender, nonfocal abdominal exam, no peritoneal signs.   Musculoskeletal: Normal range of motion.   Neurological: He is alert and oriented  to person, place, and time.   Skin: Skin is warm and dry.   Psychiatric: Judgment and thought content normal. His affect is blunt. He is agitated and withdrawn. Cognition and memory are normal. He is noncommunicative. He is inattentive.   Nursing note and vitals reviewed.      Procedures           ED Course      Labs Reviewed   URINALYSIS W/ MICROSCOPIC IF INDICATED (NO CULTURE) - Abnormal; Notable for the following:        Result Value    Ketones, UA Trace (*)     Bilirubin, UA Small (1+) (*)     Protein, UA 30 mg/dL (1+) (*)     All other components within normal limits   CBC WITH AUTO DIFFERENTIAL - Abnormal; Notable for the following:     WBC 10.25 (*)     Hemoglobin 13.1 (*)     RDW-SD 45.3 (*)     Monocyte % 13.1 (*)     Monocytes, Absolute 1.34 (*)     Immature Grans, Absolute 0.04 (*)     All other components within normal limits   URINALYSIS, MICROSCOPIC ONLY - Abnormal; Notable for the following:     Mucus, UA Moderate/2+ (*)     All other components within normal limits   VALPROIC ACID LEVEL, TOTAL - Abnormal; Notable for the following:     Valproic Acid 22.3 (*)     All other components within normal limits   COMPREHENSIVE METABOLIC PANEL - Normal   LIPASE - Normal   URINE DRUG SCREEN - Normal    Narrative:     Negative Thresholds For Drugs Screened in Urine:     Amphetamines          500 ng/ml  Barbiturates          200 ng/ml  Benzodiazepines       200 ng/ml  Cocaine               150 ng/ml  Methadone             300 ng/mL  Opiates               300 ng/mL  Oxycodone             100 ng/mL  THC                   20 ng/mL    The normal value for all drugs tested is negative. This report includes final unconfirmed screening results.  A positive result by this assay can be, at your request, sent to the Reference Lab for confirmation by gas chromatography. Unconfirmed results must not be used for non-medical purposes, such as employment or legal testing. Clinical consideration should be applied to any drug of  abuse test result, particularly when unconfirmed results are used.   RAINBOW DRAW    Narrative:     The following orders were created for panel order [place] Draw.  Procedure                               Abnormality         Status                     ---------                               -----------         ------                     Light Blue Top[[id number]]                                   Final result               Green Top (Gel)[[id number]]                                  Final result               Lavender Top[[id number]]                                     Final result               Gold Top - SST[[id number]]                                   Final result                 Please view results for these tests on the individual orders.   CBC AND DIFFERENTIAL    Narrative:     The following orders were created for panel order CBC & Differential.  Procedure                               Abnormality         Status                     ---------                               -----------         ------                     CBC Auto Differential[[id number]]        Abnormal            Final result                 Please view results for these tests on the individual orders.   LIGHT BLUE TOP   GREEN TOP   LAVENDER TOP   GOLD TOP - SST       XR Abdomen 2 View With Chest 1 View   Final Result   1. No acute cardiopulmonary disease.   2. Nonspecific abdomen.      Electronically signed by:  Henry French  6/6/2018 10:44 PM   CDT Workstation: [id number]        Slightly subtherapeutic Depakote level, no acute pathology is noted.  We will increase the Depakote 3 days a week as he was high at 3 pills a day.  We will also discharge to outpatient follow-up.            MDM      Final diagnoses:   Abdominal pain, unspecified abdominal location   Nausea            Ronald Llanes MD  06/06/18 [id number]

## 2018-06-07 NOTE — DISCHARGE INSTRUCTIONS
Increased your Depakote dose from 2 pills to 2-1/2 pills on Monday Wednesday Friday.  Please recheck your Depakote level in approximately one week with her primary care doctor.  Zofran as needed for nausea.  Return with any new or worsening symptoms or any concerns.

## 2018-06-22 ENCOUNTER — OFFICE VISIT (OUTPATIENT)
Dept: FAMILY MEDICINE CLINIC | Facility: CLINIC | Age: 38
End: 2018-06-22

## 2018-06-22 VITALS
SYSTOLIC BLOOD PRESSURE: 100 MMHG | HEIGHT: 73 IN | WEIGHT: 201.6 LBS | DIASTOLIC BLOOD PRESSURE: 80 MMHG | BODY MASS INDEX: 26.72 KG/M2

## 2018-06-22 DIAGNOSIS — R10.13 DYSPEPSIA: Primary | ICD-10-CM

## 2018-06-22 DIAGNOSIS — F31.9 BIPOLAR AFFECTIVE DISORDER, REMISSION STATUS UNSPECIFIED (HCC): Chronic | ICD-10-CM

## 2018-06-22 PROCEDURE — 99213 OFFICE O/P EST LOW 20 MIN: CPT | Performed by: FAMILY MEDICINE

## 2018-06-22 RX ORDER — RANITIDINE 300 MG/1
TABLET ORAL
Qty: 90 TABLET | Refills: 1 | Status: SHIPPED | OUTPATIENT
Start: 2018-06-22 | End: 2019-12-18

## 2018-06-22 NOTE — PROGRESS NOTES
Subjective   Erickson Singh is a 37 y.o. male.     History of Present Illness follow-up ER visit for dyspepsia body aches abdominal pain.  All studies were normal.  Had Depakote level slightly changed mother states is feeling and acting better.  Assessment chronic dyspepsia tobacco abuse.  Counseled today on reflux and dyspepsia precautions watching caffeine and nicotine aspirin etc. counseled cutting back on smoking.  Use of when necessary Zantac as outlined otherwise recheck directed    The following portions of the patient's history were reviewed and updated as appropriate: allergies, current medications, past family history, past medical history, past social history, past surgical history and problem list.    Review of Systems   Constitutional: Negative for activity change, appetite change, fatigue and unexpected weight change.   HENT: Negative for trouble swallowing and voice change.    Eyes: Negative for redness and visual disturbance.   Respiratory: Negative for cough and wheezing.    Cardiovascular: Negative for chest pain and palpitations.   Gastrointestinal: Negative for abdominal pain, constipation, diarrhea, nausea and vomiting.   Genitourinary: Negative for urgency.   Musculoskeletal: Negative for joint swelling.   Neurological: Negative for syncope and headaches.   Hematological: Negative for adenopathy.   Psychiatric/Behavioral: Negative for sleep disturbance.       Objective   Physical Exam   Constitutional: He appears well-developed.   HENT:   Head: Normocephalic.   Eyes: Pupils are equal, round, and reactive to light.   Neck: Normal range of motion.   Cardiovascular: Normal rate.    Pulmonary/Chest: Effort normal.   Abdominal: Soft. Bowel sounds are normal. There is no tenderness.   Psychiatric: His affect is blunt. He is slowed. He is noncommunicative.       Assessment/Plan   Erickson was seen today for follow-up.    Diagnoses and all orders for this visit:    Dyspepsia  -     raNITIdine  (ZANTAC) 300 MG tablet; 1qhs for stomach x 2wks then prn    Bipolar affective disorder, remission status unspecified

## 2019-02-01 ENCOUNTER — TRANSCRIBE ORDERS (OUTPATIENT)
Dept: LAB | Facility: HOSPITAL | Age: 39
End: 2019-02-01

## 2019-02-01 ENCOUNTER — LAB (OUTPATIENT)
Dept: LAB | Facility: HOSPITAL | Age: 39
End: 2019-02-01

## 2019-02-01 DIAGNOSIS — F20.9 SCHIZOPHRENIA, UNSPECIFIED TYPE (HCC): Primary | ICD-10-CM

## 2019-02-01 DIAGNOSIS — F20.9 SCHIZOPHRENIA, UNSPECIFIED TYPE (HCC): ICD-10-CM

## 2019-02-01 LAB
25(OH)D3 SERPL-MCNC: 26.9 NG/ML (ref 30–100)
ALBUMIN SERPL-MCNC: 5 G/DL (ref 3.4–4.8)
ALBUMIN/GLOB SERPL: 1.6 G/DL (ref 1.1–1.8)
ALP SERPL-CCNC: 60 U/L (ref 38–126)
ALT SERPL W P-5'-P-CCNC: 11 U/L (ref 21–72)
ANION GAP SERPL CALCULATED.3IONS-SCNC: 8 MMOL/L (ref 5–15)
AST SERPL-CCNC: 37 U/L (ref 17–59)
BASOPHILS # BLD AUTO: 0.01 10*3/MM3 (ref 0–0.2)
BASOPHILS NFR BLD AUTO: 0.1 % (ref 0–2)
BILIRUB SERPL-MCNC: 0.4 MG/DL (ref 0.2–1.3)
BUN BLD-MCNC: 14 MG/DL (ref 7–21)
BUN/CREAT SERPL: 12.3 (ref 7–25)
CALCIUM SPEC-SCNC: 10.5 MG/DL (ref 8.4–10.2)
CHLORIDE SERPL-SCNC: 102 MMOL/L (ref 95–110)
CO2 SERPL-SCNC: 30 MMOL/L (ref 22–31)
CREAT BLD-MCNC: 1.14 MG/DL (ref 0.7–1.3)
DEPRECATED RDW RBC AUTO: 47.9 FL (ref 35.1–43.9)
EOSINOPHIL # BLD AUTO: 0.09 10*3/MM3 (ref 0–0.7)
EOSINOPHIL NFR BLD AUTO: 0.7 % (ref 0–7)
ERYTHROCYTE [DISTWIDTH] IN BLOOD BY AUTOMATED COUNT: 14.3 % (ref 11.5–14.5)
GFR SERPL CREATININE-BSD FRML MDRD: 87 ML/MIN/1.73 (ref 70–162)
GLOBULIN UR ELPH-MCNC: 3.2 GM/DL (ref 2.3–3.5)
GLUCOSE BLD-MCNC: 105 MG/DL (ref 60–100)
HCT VFR BLD AUTO: 45.6 % (ref 39–49)
HGB BLD-MCNC: 15.2 G/DL (ref 13.7–17.3)
IMM GRANULOCYTES # BLD AUTO: 0.04 10*3/MM3 (ref 0–0.02)
IMM GRANULOCYTES NFR BLD AUTO: 0.3 % (ref 0–0.5)
LYMPHOCYTES # BLD AUTO: 2.02 10*3/MM3 (ref 0.6–4.2)
LYMPHOCYTES NFR BLD AUTO: 15.1 % (ref 10–50)
MCH RBC QN AUTO: 30.2 PG (ref 26.5–34)
MCHC RBC AUTO-ENTMCNC: 33.3 G/DL (ref 31.5–36.3)
MCV RBC AUTO: 90.7 FL (ref 80–98)
MONOCYTES # BLD AUTO: 1.18 10*3/MM3 (ref 0–0.9)
MONOCYTES NFR BLD AUTO: 8.8 % (ref 0–12)
NEUTROPHILS # BLD AUTO: 10.03 10*3/MM3 (ref 2–8.6)
NEUTROPHILS NFR BLD AUTO: 75 % (ref 37–80)
PLATELET # BLD AUTO: 345 10*3/MM3 (ref 150–450)
PMV BLD AUTO: 9.6 FL (ref 8–12)
POTASSIUM BLD-SCNC: 4.4 MMOL/L (ref 3.5–5.1)
PROT SERPL-MCNC: 8.2 G/DL (ref 6.3–8.6)
RBC # BLD AUTO: 5.03 10*6/MM3 (ref 4.37–5.74)
SODIUM BLD-SCNC: 140 MMOL/L (ref 137–145)
VALPROATE SERPL-MCNC: 82.6 MCG/ML (ref 50–120)
VIT B12 BLD-MCNC: 637 PG/ML (ref 239–931)
WBC NRBC COR # BLD: 13.37 10*3/MM3 (ref 3.2–9.8)

## 2019-02-01 PROCEDURE — 82607 VITAMIN B-12: CPT

## 2019-02-01 PROCEDURE — 84436 ASSAY OF TOTAL THYROXINE: CPT

## 2019-02-01 PROCEDURE — 85025 COMPLETE CBC W/AUTO DIFF WBC: CPT

## 2019-02-01 PROCEDURE — 84479 ASSAY OF THYROID (T3 OR T4): CPT

## 2019-02-01 PROCEDURE — 80164 ASSAY DIPROPYLACETIC ACD TOT: CPT

## 2019-02-01 PROCEDURE — 36415 COLL VENOUS BLD VENIPUNCTURE: CPT

## 2019-02-01 PROCEDURE — 84443 ASSAY THYROID STIM HORMONE: CPT

## 2019-02-01 PROCEDURE — 82306 VITAMIN D 25 HYDROXY: CPT

## 2019-02-01 PROCEDURE — 80053 COMPREHEN METABOLIC PANEL: CPT

## 2019-02-02 LAB
FT4I SERPL CALC-MCNC: 1.6 (ref 1.2–4.9)
T3RU NFR SERPL: 23 % (ref 24–39)
T4 SERPL-MCNC: 6.9 UG/DL (ref 4.5–12)
TSH SERPL-ACNC: 1.92 UIU/ML (ref 0.45–4.5)

## 2019-12-18 ENCOUNTER — OFFICE VISIT (OUTPATIENT)
Dept: FAMILY MEDICINE CLINIC | Facility: CLINIC | Age: 39
End: 2019-12-18

## 2019-12-18 VITALS
WEIGHT: 205.5 LBS | DIASTOLIC BLOOD PRESSURE: 70 MMHG | HEIGHT: 73 IN | TEMPERATURE: 97.6 F | SYSTOLIC BLOOD PRESSURE: 102 MMHG | BODY MASS INDEX: 27.23 KG/M2

## 2019-12-18 DIAGNOSIS — F17.200 TOBACCO DEPENDENCE SYNDROME: Chronic | ICD-10-CM

## 2019-12-18 DIAGNOSIS — F20.9 CHRONIC UNDIFFERENTIATED SCHIZOPHRENIA (HCC): Chronic | ICD-10-CM

## 2019-12-18 DIAGNOSIS — R11.2 NON-INTRACTABLE VOMITING WITH NAUSEA, UNSPECIFIED VOMITING TYPE: ICD-10-CM

## 2019-12-18 DIAGNOSIS — R10.13 DYSPEPSIA: Primary | Chronic | ICD-10-CM

## 2019-12-18 PROCEDURE — 99214 OFFICE O/P EST MOD 30 MIN: CPT | Performed by: FAMILY MEDICINE

## 2019-12-18 RX ORDER — SUCRALFATE 1 G/1
1 TABLET ORAL 4 TIMES DAILY
Qty: 120 TABLET | Refills: 1 | Status: SHIPPED | OUTPATIENT
Start: 2019-12-18 | End: 2020-05-26 | Stop reason: ALTCHOICE

## 2019-12-18 NOTE — PROGRESS NOTES
Subjective   Erickson Singh is a 39 y.o. male.     History of Present Illness   requesting reevaluation persistent dyspepsia nausea and vomiting.  Said increased symptoms over the past 2 months according to mother.  Continues with schizophrenia and bipolar disorder.  Patient is apparently is fixated on cigarettes now smoking with him as heavy as before.  Now developing vomiting abdominal pain burning etc.  Had been on both H1 blocker and then proton pump blocker over-the-counter but mother states that the proton pump blocker and perhaps H1 and him better actually made hallucinations worse.  Has had no previous upper endoscopy and/or surgery.  Had a negative ultrasound about 2 years ago.  HPI    The following portions of the patient's history were reviewed and updated as appropriate: allergies, current medications, past family history, past medical history, past social history, past surgical history and problem list.    Review of Systems  Review of Systems   Constitutional: Negative for activity change, appetite change, fatigue and unexpected weight change.   HENT: Negative for trouble swallowing and voice change.    Eyes: Negative for redness and visual disturbance.   Respiratory: Negative for cough and wheezing.    Cardiovascular: Negative for chest pain and palpitations.   Gastrointestinal: Positive for abdominal pain and vomiting. Negative for constipation, diarrhea and nausea.   Genitourinary: Negative for urgency.   Musculoskeletal: Negative for joint swelling.   Neurological: Negative for syncope and headaches.   Hematological: Negative for adenopathy.   Psychiatric/Behavioral: Positive for behavioral problems and decreased concentration. Negative for sleep disturbance.       Objective   Physical Exam  Physical Exam   Constitutional: He is oriented to person, place, and time. He appears well-developed.   HENT:   Head: Normocephalic.   Nose: Nose normal.   Eyes: Pupils are equal, round, and reactive  "to light.   Neck: Normal range of motion. No thyromegaly present.   Cardiovascular: Normal rate, regular rhythm, normal heart sounds and intact distal pulses. Exam reveals no gallop and no friction rub.   No murmur heard.  Pulmonary/Chest: Breath sounds normal.   Abdominal: Soft. He exhibits no distension and no mass. There is no tenderness. There is no rebound and no guarding. No hernia.   Musculoskeletal: Normal range of motion.   Neurological: He is alert and oriented to person, place, and time. He has normal reflexes.   Skin: Skin is warm and dry.   Psychiatric: His affect is blunt. His speech is delayed. He is slowed. Cognition and memory are impaired. He is noncommunicative.         Visit Vitals  /70   Temp 97.6 °F (36.4 °C)   Ht 185.4 cm (73\")   Wt 93.2 kg (205 lb 8 oz)   BMI 27.11 kg/m²     Body mass index is 27.11 kg/m².      Assessment/Plan   Erickson was seen today for nausea, back pain, headache and vomiting.    Diagnoses and all orders for this visit:    Dyspepsia  -     US Gallbladder  -     Ambulatory Referral to General Surgery  -     sucralfate (CARAFATE) 1 g tablet; Take 1 tablet by mouth 4 (Four) Times a Day. X 4wks    Non-intractable vomiting with nausea, unspecified vomiting type  -     US Gallbladder  -     Ambulatory Referral to General Surgery  -     sucralfate (CARAFATE) 1 g tablet; Take 1 tablet by mouth 4 (Four) Times a Day. X 4wks    Chronic undifferentiated schizophrenia (CMS/HCC)    Tobacco dependence syndrome    Persistent dyspepsia at risk for peptic ulcer disease esophagitis off a general ulcer etc.  I have counseled again acutely on cutting back on smoking Carafate to act as a coating agent will try to avoid the other due to perhaps psychiatric side effect ranges made for repeat ultrasound and for possible need for upper endoscopy.   disease possibilities.  Patient has had a flu vaccine.  History is reviewed recheck after studies as needed  "

## 2020-01-13 ENCOUNTER — HOSPITAL ENCOUNTER (OUTPATIENT)
Dept: ULTRASOUND IMAGING | Facility: HOSPITAL | Age: 40
Discharge: HOME | End: 2020-01-13
Admitting: FAMILY MEDICINE

## 2020-01-13 PROCEDURE — 76705 ECHO EXAM OF ABDOMEN: CPT

## 2020-04-30 ENCOUNTER — APPOINTMENT (OUTPATIENT)
Dept: GENERAL RADIOLOGY | Facility: HOSPITAL | Age: 40
End: 2020-04-30

## 2020-04-30 ENCOUNTER — HOSPITAL ENCOUNTER (INPATIENT)
Facility: HOSPITAL | Age: 40
LOS: 3 days | Discharge: HOME | End: 2020-05-04
Attending: FAMILY MEDICINE | Admitting: INTERNAL MEDICINE

## 2020-04-30 DIAGNOSIS — I95.9 HYPOTENSION, UNSPECIFIED HYPOTENSION TYPE: Primary | ICD-10-CM

## 2020-04-30 DIAGNOSIS — R19.7 DIARRHEA, UNSPECIFIED TYPE: ICD-10-CM

## 2020-04-30 LAB
ALBUMIN SERPL-MCNC: 3.3 G/DL (ref 3.5–5.2)
ALBUMIN/GLOB SERPL: 0.8 G/DL
ALP SERPL-CCNC: 92 U/L (ref 39–117)
ALT SERPL W P-5'-P-CCNC: 13 U/L (ref 1–41)
ANION GAP SERPL CALCULATED.3IONS-SCNC: 16 MMOL/L (ref 5–15)
ANISOCYTOSIS BLD QL: ABNORMAL
APTT PPP: 29.2 SECONDS (ref 20–40.3)
ARTERIAL PATENCY WRIST A: POSITIVE
AST SERPL-CCNC: 27 U/L (ref 1–40)
ATMOSPHERIC PRESS: 744 MMHG
BACTERIA UR QL AUTO: ABNORMAL /HPF
BASE EXCESS BLDA CALC-SCNC: 0.5 MMOL/L (ref 0–2)
BDY SITE: ABNORMAL
BILIRUB SERPL-MCNC: 0.4 MG/DL (ref 0.2–1.2)
BILIRUB UR QL STRIP: ABNORMAL
BUN BLD-MCNC: 31 MG/DL (ref 6–20)
BUN/CREAT SERPL: 20.4 (ref 7–25)
CA-I BLD-MCNC: 4.58 MG/DL (ref 4.6–5.6)
CALCIUM SPEC-SCNC: 9.2 MG/DL (ref 8.6–10.5)
CHLORIDE SERPL-SCNC: 89 MMOL/L (ref 98–107)
CK SERPL-CCNC: 292 U/L (ref 20–200)
CLARITY UR: ABNORMAL
CO2 SERPL-SCNC: 24 MMOL/L (ref 22–29)
COLOR UR: ABNORMAL
CREAT BLD-MCNC: 1.52 MG/DL (ref 0.76–1.27)
CRP SERPL-MCNC: 31.18 MG/DL (ref 0–0.5)
D-LACTATE SERPL-SCNC: 1.6 MMOL/L (ref 0.5–2)
DEPRECATED RDW RBC AUTO: 42.5 FL (ref 37–54)
DOHLE BODIES: PRESENT
ERYTHROCYTE [DISTWIDTH] IN BLOOD BY AUTOMATED COUNT: 13.6 % (ref 12.3–15.4)
GFR SERPL CREATININE-BSD FRML MDRD: 62 ML/MIN/1.73
GLOBULIN UR ELPH-MCNC: 4.2 GM/DL
GLUCOSE BLD-MCNC: 130 MG/DL (ref 65–99)
GLUCOSE UR STRIP-MCNC: NEGATIVE MG/DL
HCO3 BLDA-SCNC: 24.2 MMOL/L (ref 20–26)
HCT VFR BLD AUTO: 35.9 % (ref 37.5–51)
HGB BLD-MCNC: 12.4 G/DL (ref 13–17.7)
HGB UR QL STRIP.AUTO: NEGATIVE
HOLD SPECIMEN: NORMAL
HOLD SPECIMEN: NORMAL
HYALINE CASTS UR QL AUTO: ABNORMAL /LPF
HYPOCHROMIA BLD QL: ABNORMAL
INR PPP: 1.25 (ref 0.8–1.2)
KETONES UR QL STRIP: ABNORMAL
LEUKOCYTE ESTERASE UR QL STRIP.AUTO: ABNORMAL
LYMPHOCYTES # BLD MANUAL: 0.82 10*3/MM3 (ref 0.7–3.1)
LYMPHOCYTES NFR BLD MANUAL: 3 % (ref 19.6–45.3)
LYMPHOCYTES NFR BLD MANUAL: 7 % (ref 5–12)
MAGNESIUM SERPL-MCNC: 2.8 MG/DL (ref 1.6–2.6)
MCH RBC QN AUTO: 29.5 PG (ref 26.6–33)
MCHC RBC AUTO-ENTMCNC: 34.5 G/DL (ref 31.5–35.7)
MCV RBC AUTO: 85.5 FL (ref 79–97)
MODALITY: ABNORMAL
MONOCYTES # BLD AUTO: 1.9 10*3/MM3 (ref 0.1–0.9)
NEUTROPHILS # BLD AUTO: 24.48 10*3/MM3 (ref 1.7–7)
NEUTROPHILS NFR BLD MANUAL: 64 % (ref 42.7–76)
NEUTS BAND NFR BLD MANUAL: 26 % (ref 0–5)
NITRITE UR QL STRIP: NEGATIVE
PCO2 BLDA: 35.1 MM HG (ref 35–45)
PH BLDA: 7.45 PH UNITS (ref 7.35–7.45)
PH UR STRIP.AUTO: <=5 [PH] (ref 5–9)
PHOSPHATE SERPL-MCNC: 3.6 MG/DL (ref 2.5–4.5)
PLAT MORPH BLD: NORMAL
PLATELET # BLD AUTO: 400 10*3/MM3 (ref 140–450)
PMV BLD AUTO: 10 FL (ref 6–12)
PO2 BLDA: 70.5 MM HG (ref 83–108)
POTASSIUM BLD-SCNC: 4.2 MMOL/L (ref 3.5–5.2)
PROT SERPL-MCNC: 7.5 G/DL (ref 6–8.5)
PROT UR QL STRIP: ABNORMAL
PROTHROMBIN TIME: 15.5 SECONDS (ref 11.1–15.3)
RBC # BLD AUTO: 4.2 10*6/MM3 (ref 4.14–5.8)
RBC # UR: ABNORMAL /HPF
REF LAB TEST METHOD: ABNORMAL
SAO2 % BLDCOA: 94.7 % (ref 94–99)
SARS-COV-2 RNA RESP QL NAA+PROBE: NOT DETECTED
SODIUM BLD-SCNC: 129 MMOL/L (ref 136–145)
SP GR UR STRIP: 1.02 (ref 1–1.03)
SQUAMOUS #/AREA URNS HPF: ABNORMAL /HPF
TOXIC GRANULATION: ABNORMAL
UROBILINOGEN UR QL STRIP: ABNORMAL
VALPROATE SERPL-MCNC: 62.7 MCG/ML (ref 50–125)
VENTILATOR MODE: ABNORMAL
WBC NRBC COR # BLD: 27.2 10*3/MM3 (ref 3.4–10.8)
WBC UR QL AUTO: ABNORMAL /HPF
WHOLE BLOOD HOLD SPECIMEN: NORMAL
WHOLE BLOOD HOLD SPECIMEN: NORMAL

## 2020-04-30 PROCEDURE — 94799 UNLISTED PULMONARY SVC/PX: CPT

## 2020-04-30 PROCEDURE — P9612 CATHETERIZE FOR URINE SPEC: HCPCS

## 2020-04-30 PROCEDURE — 87147 CULTURE TYPE IMMUNOLOGIC: CPT | Performed by: FAMILY MEDICINE

## 2020-04-30 PROCEDURE — 71045 X-RAY EXAM CHEST 1 VIEW: CPT

## 2020-04-30 PROCEDURE — 85730 THROMBOPLASTIN TIME PARTIAL: CPT | Performed by: FAMILY MEDICINE

## 2020-04-30 PROCEDURE — 87086 URINE CULTURE/COLONY COUNT: CPT | Performed by: FAMILY MEDICINE

## 2020-04-30 PROCEDURE — 25010000002 CEFTRIAXONE: Performed by: INTERNAL MEDICINE

## 2020-04-30 PROCEDURE — 36600 WITHDRAWAL OF ARTERIAL BLOOD: CPT

## 2020-04-30 PROCEDURE — 84100 ASSAY OF PHOSPHORUS: CPT | Performed by: FAMILY MEDICINE

## 2020-04-30 PROCEDURE — 99285 EMERGENCY DEPT VISIT HI MDM: CPT

## 2020-04-30 PROCEDURE — 87635 SARS-COV-2 COVID-19 AMP PRB: CPT | Performed by: FAMILY MEDICINE

## 2020-04-30 PROCEDURE — 82330 ASSAY OF CALCIUM: CPT | Performed by: FAMILY MEDICINE

## 2020-04-30 PROCEDURE — 87040 BLOOD CULTURE FOR BACTERIA: CPT | Performed by: FAMILY MEDICINE

## 2020-04-30 PROCEDURE — 82803 BLOOD GASES ANY COMBINATION: CPT

## 2020-04-30 PROCEDURE — 93010 ELECTROCARDIOGRAM REPORT: CPT | Performed by: INTERNAL MEDICINE

## 2020-04-30 PROCEDURE — 86140 C-REACTIVE PROTEIN: CPT | Performed by: FAMILY MEDICINE

## 2020-04-30 PROCEDURE — 83605 ASSAY OF LACTIC ACID: CPT | Performed by: FAMILY MEDICINE

## 2020-04-30 PROCEDURE — 74018 RADEX ABDOMEN 1 VIEW: CPT

## 2020-04-30 PROCEDURE — G0378 HOSPITAL OBSERVATION PER HR: HCPCS

## 2020-04-30 PROCEDURE — 81001 URINALYSIS AUTO W/SCOPE: CPT | Performed by: FAMILY MEDICINE

## 2020-04-30 PROCEDURE — 85025 COMPLETE CBC W/AUTO DIFF WBC: CPT | Performed by: FAMILY MEDICINE

## 2020-04-30 PROCEDURE — 80053 COMPREHEN METABOLIC PANEL: CPT | Performed by: FAMILY MEDICINE

## 2020-04-30 PROCEDURE — 87150 DNA/RNA AMPLIFIED PROBE: CPT | Performed by: FAMILY MEDICINE

## 2020-04-30 PROCEDURE — 25010000002 ONDANSETRON PER 1 MG: Performed by: FAMILY MEDICINE

## 2020-04-30 PROCEDURE — 25010000002 MORPHINE PER 10 MG: Performed by: FAMILY MEDICINE

## 2020-04-30 PROCEDURE — 93005 ELECTROCARDIOGRAM TRACING: CPT | Performed by: FAMILY MEDICINE

## 2020-04-30 PROCEDURE — 83735 ASSAY OF MAGNESIUM: CPT | Performed by: FAMILY MEDICINE

## 2020-04-30 PROCEDURE — 85610 PROTHROMBIN TIME: CPT | Performed by: FAMILY MEDICINE

## 2020-04-30 PROCEDURE — 85007 BL SMEAR W/DIFF WBC COUNT: CPT | Performed by: FAMILY MEDICINE

## 2020-04-30 PROCEDURE — 82550 ASSAY OF CK (CPK): CPT | Performed by: FAMILY MEDICINE

## 2020-04-30 PROCEDURE — 80164 ASSAY DIPROPYLACETIC ACD TOT: CPT | Performed by: FAMILY MEDICINE

## 2020-04-30 RX ORDER — SODIUM CHLORIDE 9 MG/ML
INJECTION, SOLUTION INTRAVENOUS
Status: COMPLETED
Start: 2020-04-30 | End: 2020-04-30

## 2020-04-30 RX ORDER — L. ACIDOPHILUS/L.BULGARICUS 1MM CELL
1 TABLET ORAL DAILY
Status: DISCONTINUED | OUTPATIENT
Start: 2020-04-30 | End: 2020-05-04 | Stop reason: HOSPADM

## 2020-04-30 RX ORDER — SODIUM CHLORIDE 0.9 % (FLUSH) 0.9 %
10 SYRINGE (ML) INJECTION AS NEEDED
Status: DISCONTINUED | OUTPATIENT
Start: 2020-04-30 | End: 2020-05-04 | Stop reason: HOSPADM

## 2020-04-30 RX ORDER — SODIUM CHLORIDE 9 MG/ML
125 INJECTION, SOLUTION INTRAVENOUS CONTINUOUS
Status: DISCONTINUED | OUTPATIENT
Start: 2020-04-30 | End: 2020-04-30 | Stop reason: DRUGHIGH

## 2020-04-30 RX ORDER — METRONIDAZOLE 500 MG/1
500 TABLET ORAL EVERY 8 HOURS SCHEDULED
Status: DISCONTINUED | OUTPATIENT
Start: 2020-04-30 | End: 2020-05-04 | Stop reason: HOSPADM

## 2020-04-30 RX ORDER — ZIPRASIDONE HYDROCHLORIDE 40 MG/1
80 CAPSULE ORAL 2 TIMES DAILY WITH MEALS
Status: DISCONTINUED | OUTPATIENT
Start: 2020-04-30 | End: 2020-05-04 | Stop reason: HOSPADM

## 2020-04-30 RX ORDER — ASCORBIC ACID 500 MG
500 TABLET ORAL DAILY
Status: DISCONTINUED | OUTPATIENT
Start: 2020-04-30 | End: 2020-05-04 | Stop reason: HOSPADM

## 2020-04-30 RX ORDER — ZINC SULFATE 50(220)MG
220 CAPSULE ORAL DAILY
Status: DISCONTINUED | OUTPATIENT
Start: 2020-04-30 | End: 2020-05-04 | Stop reason: HOSPADM

## 2020-04-30 RX ORDER — SODIUM CHLORIDE 0.9 % (FLUSH) 0.9 %
10 SYRINGE (ML) INJECTION EVERY 12 HOURS SCHEDULED
Status: DISCONTINUED | OUTPATIENT
Start: 2020-04-30 | End: 2020-05-04 | Stop reason: HOSPADM

## 2020-04-30 RX ORDER — SODIUM CHLORIDE 9 MG/ML
150 INJECTION, SOLUTION INTRAVENOUS CONTINUOUS
Status: DISCONTINUED | OUTPATIENT
Start: 2020-04-30 | End: 2020-05-04 | Stop reason: HOSPADM

## 2020-04-30 RX ORDER — ONDANSETRON 2 MG/ML
4 INJECTION INTRAMUSCULAR; INTRAVENOUS ONCE
Status: COMPLETED | OUTPATIENT
Start: 2020-04-30 | End: 2020-04-30

## 2020-04-30 RX ORDER — DIVALPROEX SODIUM 500 MG/1
500 TABLET, EXTENDED RELEASE ORAL 2 TIMES DAILY
Status: DISCONTINUED | OUTPATIENT
Start: 2020-04-30 | End: 2020-05-04 | Stop reason: HOSPADM

## 2020-04-30 RX ADMIN — SODIUM CHLORIDE 125 ML/HR: 9 INJECTION, SOLUTION INTRAVENOUS at 16:04

## 2020-04-30 RX ADMIN — SODIUM CHLORIDE 150 ML/HR: 900 INJECTION, SOLUTION INTRAVENOUS at 19:37

## 2020-04-30 RX ADMIN — METRONIDAZOLE 500 MG: 500 TABLET ORAL at 20:20

## 2020-04-30 RX ADMIN — CEFTRIAXONE 1 G: 1 INJECTION, POWDER, FOR SOLUTION INTRAMUSCULAR; INTRAVENOUS at 20:18

## 2020-04-30 RX ADMIN — SODIUM CHLORIDE 125 ML/HR: 900 INJECTION, SOLUTION INTRAVENOUS at 16:04

## 2020-04-30 RX ADMIN — DIVALPROEX SODIUM 500 MG: 500 TABLET, FILM COATED, EXTENDED RELEASE ORAL at 20:21

## 2020-04-30 RX ADMIN — LACTOBACILLUS TAB 1 TABLET: TAB at 20:21

## 2020-04-30 RX ADMIN — MORPHINE SULFATE 4 MG: 4 INJECTION, SOLUTION INTRAMUSCULAR; INTRAVENOUS at 14:12

## 2020-04-30 RX ADMIN — ZIPRASIDONE HCL 80 MG: 40 CAPSULE ORAL at 20:19

## 2020-04-30 RX ADMIN — SODIUM CHLORIDE, PRESERVATIVE FREE 10 ML: 5 INJECTION INTRAVENOUS at 20:19

## 2020-04-30 RX ADMIN — ONDANSETRON 4 MG: 2 INJECTION INTRAMUSCULAR; INTRAVENOUS at 13:52

## 2020-04-30 RX ADMIN — Medication 500 MG: at 20:19

## 2020-04-30 RX ADMIN — ZINC SULFATE 220 MG (50 MG) CAPSULE 220 MG: CAPSULE at 20:19

## 2020-04-30 RX ADMIN — SODIUM CHLORIDE 1000 ML: 900 INJECTION, SOLUTION INTRAVENOUS at 13:52

## 2020-05-01 LAB
ADV 40+41 DNA STL QL NAA+NON-PROBE: NOT DETECTED
ANION GAP SERPL CALCULATED.3IONS-SCNC: 14 MMOL/L (ref 5–15)
ASTRO TYP 1-8 RNA STL QL NAA+NON-PROBE: NOT DETECTED
BACTERIA BLD CULT: ABNORMAL
BASOPHILS # BLD AUTO: 0.18 10*3/MM3 (ref 0–0.2)
BASOPHILS NFR BLD AUTO: 0.8 % (ref 0–1.5)
BUN BLD-MCNC: 27 MG/DL (ref 6–20)
BUN/CREAT SERPL: 27 (ref 7–25)
C CAYETANENSIS DNA STL QL NAA+NON-PROBE: NOT DETECTED
C DIFF TOX GENS STL QL NAA+PROBE: NEGATIVE
CALCIUM SPEC-SCNC: 8.2 MG/DL (ref 8.6–10.5)
CAMPY SP DNA.DIARRHEA STL QL NAA+PROBE: NOT DETECTED
CHLORIDE SERPL-SCNC: 97 MMOL/L (ref 98–107)
CO2 SERPL-SCNC: 20 MMOL/L (ref 22–29)
CREAT BLD-MCNC: 1 MG/DL (ref 0.76–1.27)
CRP SERPL-MCNC: 16.96 MG/DL (ref 0–0.5)
CRYPTOSP STL CULT: NOT DETECTED
DEPRECATED RDW RBC AUTO: 43.4 FL (ref 37–54)
E COLI DNA SPEC QL NAA+PROBE: NOT DETECTED
E HISTOLYT AG STL-ACNC: NOT DETECTED
EAEC PAA PLAS AGGR+AATA ST NAA+NON-PRB: NOT DETECTED
EC STX1 + STX2 GENES STL NAA+PROBE: NOT DETECTED
EOSINOPHIL # BLD AUTO: 0.12 10*3/MM3 (ref 0–0.4)
EOSINOPHIL NFR BLD AUTO: 0.5 % (ref 0.3–6.2)
EPEC EAE GENE STL QL NAA+NON-PROBE: NOT DETECTED
ERYTHROCYTE [DISTWIDTH] IN BLOOD BY AUTOMATED COUNT: 13.8 % (ref 12.3–15.4)
ETEC LTA+ST1A+ST1B TOX ST NAA+NON-PROBE: NOT DETECTED
G LAMBLIA DNA SPEC QL NAA+PROBE: NOT DETECTED
GFR SERPL CREATININE-BSD FRML MDRD: 100 ML/MIN/1.73
GLUCOSE BLD-MCNC: 132 MG/DL (ref 65–99)
HCT VFR BLD AUTO: 30.8 % (ref 37.5–51)
HGB BLD-MCNC: 10.6 G/DL (ref 13–17.7)
IMM GRANULOCYTES # BLD AUTO: 0.78 10*3/MM3 (ref 0–0.05)
IMM GRANULOCYTES NFR BLD AUTO: 3.4 % (ref 0–0.5)
LYMPHOCYTES # BLD AUTO: 1.94 10*3/MM3 (ref 0.7–3.1)
LYMPHOCYTES NFR BLD AUTO: 8.4 % (ref 19.6–45.3)
MAGNESIUM SERPL-MCNC: 2.8 MG/DL (ref 1.6–2.6)
MCH RBC QN AUTO: 29.8 PG (ref 26.6–33)
MCHC RBC AUTO-ENTMCNC: 34.4 G/DL (ref 31.5–35.7)
MCV RBC AUTO: 86.5 FL (ref 79–97)
MONOCYTES # BLD AUTO: 2.23 10*3/MM3 (ref 0.1–0.9)
MONOCYTES NFR BLD AUTO: 9.6 % (ref 5–12)
NEUTROPHILS # BLD AUTO: 17.9 10*3/MM3 (ref 1.7–7)
NEUTROPHILS NFR BLD AUTO: 77.3 % (ref 42.7–76)
NOROVIRUS GI+II RNA STL QL NAA+NON-PROBE: NOT DETECTED
NRBC BLD AUTO-RTO: 0 /100 WBC (ref 0–0.2)
P SHIGELLOIDES DNA STL QL NAA+PROBE: NOT DETECTED
PLATELET # BLD AUTO: 335 10*3/MM3 (ref 140–450)
PMV BLD AUTO: 10.6 FL (ref 6–12)
POTASSIUM BLD-SCNC: 3.9 MMOL/L (ref 3.5–5.2)
RBC # BLD AUTO: 3.56 10*6/MM3 (ref 4.14–5.8)
RV RNA STL NAA+PROBE: NOT DETECTED
SALMONELLA DNA SPEC QL NAA+PROBE: NOT DETECTED
SAPO I+II+IV+V RNA STL QL NAA+NON-PROBE: NOT DETECTED
SHIGELLA SP+EIEC IPAH STL QL NAA+PROBE: NOT DETECTED
SODIUM BLD-SCNC: 131 MMOL/L (ref 136–145)
V CHOLERAE DNA SPEC QL NAA+PROBE: NOT DETECTED
VIBRIO DNA SPEC NAA+PROBE: NOT DETECTED
WBC NRBC COR # BLD: 23.15 10*3/MM3 (ref 3.4–10.8)
YERSINIA STL CULT: NOT DETECTED

## 2020-05-01 PROCEDURE — 83735 ASSAY OF MAGNESIUM: CPT | Performed by: INTERNAL MEDICINE

## 2020-05-01 PROCEDURE — 86140 C-REACTIVE PROTEIN: CPT | Performed by: INTERNAL MEDICINE

## 2020-05-01 PROCEDURE — 85025 COMPLETE CBC W/AUTO DIFF WBC: CPT | Performed by: INTERNAL MEDICINE

## 2020-05-01 PROCEDURE — 25010000002 CEFTRIAXONE PER 250 MG: Performed by: INTERNAL MEDICINE

## 2020-05-01 PROCEDURE — 0097U HC BIOFIRE FILMARRAY GI PANEL: CPT | Performed by: FAMILY MEDICINE

## 2020-05-01 PROCEDURE — 87493 C DIFF AMPLIFIED PROBE: CPT | Performed by: INTERNAL MEDICINE

## 2020-05-01 PROCEDURE — 80048 BASIC METABOLIC PNL TOTAL CA: CPT | Performed by: INTERNAL MEDICINE

## 2020-05-01 RX ADMIN — ZINC SULFATE 220 MG (50 MG) CAPSULE 220 MG: CAPSULE at 08:03

## 2020-05-01 RX ADMIN — SODIUM CHLORIDE 150 ML/HR: 900 INJECTION, SOLUTION INTRAVENOUS at 08:12

## 2020-05-01 RX ADMIN — CEFTRIAXONE 1 G: 1 INJECTION, POWDER, FOR SOLUTION INTRAMUSCULAR; INTRAVENOUS at 18:02

## 2020-05-01 RX ADMIN — SODIUM CHLORIDE 150 ML/HR: 900 INJECTION, SOLUTION INTRAVENOUS at 00:56

## 2020-05-01 RX ADMIN — ZIPRASIDONE HCL 80 MG: 40 CAPSULE ORAL at 18:02

## 2020-05-01 RX ADMIN — SODIUM CHLORIDE 150 ML/HR: 900 INJECTION, SOLUTION INTRAVENOUS at 23:41

## 2020-05-01 RX ADMIN — LACTOBACILLUS TAB 1 TABLET: TAB at 08:04

## 2020-05-01 RX ADMIN — DIVALPROEX SODIUM 500 MG: 500 TABLET, FILM COATED, EXTENDED RELEASE ORAL at 08:03

## 2020-05-01 RX ADMIN — SODIUM CHLORIDE, PRESERVATIVE FREE 10 ML: 5 INJECTION INTRAVENOUS at 20:53

## 2020-05-01 RX ADMIN — SODIUM CHLORIDE 150 ML/HR: 900 INJECTION, SOLUTION INTRAVENOUS at 16:20

## 2020-05-01 RX ADMIN — ZIPRASIDONE HCL 80 MG: 40 CAPSULE ORAL at 08:03

## 2020-05-01 RX ADMIN — METRONIDAZOLE 500 MG: 500 TABLET ORAL at 14:27

## 2020-05-01 RX ADMIN — METRONIDAZOLE 500 MG: 500 TABLET ORAL at 05:35

## 2020-05-01 RX ADMIN — Medication 500 MG: at 08:03

## 2020-05-01 RX ADMIN — DIVALPROEX SODIUM 500 MG: 500 TABLET, FILM COATED, EXTENDED RELEASE ORAL at 20:52

## 2020-05-01 RX ADMIN — METRONIDAZOLE 500 MG: 500 TABLET ORAL at 21:39

## 2020-05-01 NOTE — PLAN OF CARE
Problem: Patient Care Overview  Goal: Plan of Care Review  Flowsheets  Taken 5/1/2020 1443 by Hernando Avery, RN  Progress: improving  Outcome Summary: VVS. continue to montior.. c-c-diff negative.  Taken 5/1/2020 1103 by Kristal Donovan RD  Plan of Care Reviewed With: patient     Problem: Fluid Volume Deficit (Adult)  Goal: Optimal Fluid Balance  Flowsheets (Taken 5/1/2020 1443)  Optimal Fluid Balance: making progress toward outcome  Note:   IVF'S infusing,  tolerating PO     Problem: Diarrhea (Adult)  Goal: Improved/Reduced Symptoms  Flowsheets (Taken 5/1/2020 1443)  Improved/Reduced Symptoms: making progress toward outcome  Note:   On flagyl, c-diff negative still having diarrhea

## 2020-05-01 NOTE — PLAN OF CARE
Problem: Patient Care Overview  Goal: Plan of Care Review  Outcome: Ongoing (interventions implemented as appropriate)  Flowsheets  Taken 5/1/2020 0135  Progress: no change  Outcome Summary: No complaints at this time. VSS. Pt resting, will continue to monitor  Taken 4/30/2020 2202  Plan of Care Reviewed With: patient

## 2020-05-01 NOTE — PROGRESS NOTES
Adult Nutrition  Assessment    Patient Name:  Erickson Singh  YOB: 1980  MRN: [id number]  Admit Date:  4/30/2020    Assessment Date:  5/1/2020    Comments:  40yo male admit w/ diarhea w/ low JOSH and tachycardia. GI panel pending. PMH includes bipolar/schizophrenia, anorectal fistula w/ repair. Reg diet, no intakes available. Current wt 197#/BMI 26.7. Labs and meds noted. Spoke w/ pt via phone d/t visitation restrictions- he states nkfa, no c/s problems. He reports decreased appetite w/ diarrhea but both of those are improved this am. States #. RD to follow hospital course.    Reason for Assessment     Row Name 05/01/20 1048          Reason for Assessment    Reason For Assessment  identified at risk by screening criteria     Diagnosis  gastrointestinal disease     Identified At Risk by Screening Criteria  MST SCORE 2+;reduced oral intake over the last month         Nutrition/Diet History     Row Name 05/01/20 1049          Nutrition/Diet History    Typical Food/Fluid Intake  Spoke w/ pt via phone- he states nkfa, no c/s problems. He reports decreased appetite w/ diarrhea but both of those are improved this am. States #.     Factors Affecting Nutritional Intake  diarrhea         Anthropometrics     Row Name 05/01/20 0500          Anthropometrics    Weight  89.6 kg (197 lb 8 oz)         Labs/Tests/Procedures/Meds     Row Name 05/01/20 1051          Labs/Procedures/Meds    Lab Results Reviewed  reviewed     Lab Results Comments  Glu 130/132, CRP/troponin/Mg elevated, low Na/Cl, BUN 27H/Cr 1.0, ALb 3.3L        Diagnostic Tests/Procedures    Diagnostic Test/Procedure Reviewed  reviewed     Diagnostic Test/Procedures Comments  XR abdomen/KUB, CXR        Medications    Pertinent Medications Reviewed  reviewed     Pertinent Medications Comments  floranex, Vit C/Zn           Estimated/Assessed Needs     Row Name 05/01/20 1053          Calculation Measurements    Weight Used For  Calculations  90.7 kg (200 lb)        Estimated/Assessed Needs    Additional Documentation  Fluid Requirements (Group);Protein Requirements (Group);Calorie Requirements (Group);KCAL/KG (Group)        Calorie Requirements    Estimated Calorie Requirement (kcal/day)  2000        KCAL/KG    KCAL/KG  20 Kcal/Kg (kcal);25 Kcal/Kg (kcal)     20 Kcal/Kg (kcal)  1814.38     25 Kcal/Kg (kcal)  2267.975        Protein Requirements    Weight Used For Protein Calculations  90.7 kg (200 lb)     Est Protein Requirement Amount (gms/kg)  1.0 gm protein     Estimated Protein Requirements (gms/day)  90.72        Fluid Requirements    Estimated Fluid Requirements (mL/day)  2000     RDA Method (mL)  2000         Nutrition Prescription Ordered     Row Name 05/01/20 1055          Nutrition Prescription PO    Current PO Diet  Regular         Evaluation of Received Nutrient/Fluid Intake     Row Name 05/01/20 1055          PO Evaluation    Number of Days PO Intake Evaluated  Insufficient Data               Electronically signed by:  Kristal Donovan RD  05/01/20 10:57

## 2020-05-01 NOTE — PROGRESS NOTES
[facility] Medicine Services  INPATIENT PROGRESS NOTE    Length of Stay: 0  Date of Admission: 4/30/2020  Primary Care Physician: Jatin Tamayo MD    Subjective   Chief Complaint: diarrhea     HPI: 40 yo male with past medical hx of bipolar d/o chronic, undifferentiated schizophrenia, tobacco abuse comes to the hospital due to abdominal pain, nausea and diarrhea for the past 2 days. Pt denies fever, fatigue, vomiting, dysuria, myalgias or rash. Pt also c/o generalized low back pain. Pt states he has been in contact with people who were tested positive for COVID-19.     covid-19 negative  Blood cx 1/2 positive, coag negative stap, final results pending  Review of Systems     All pertinent negatives and positives are as above. All other systems have been reviewed and are negative unless otherwise stated.     Objective    Temp:  [96 °F (35.6 °C)-98.9 °F (37.2 °C)] 96.2 °F (35.7 °C)  Heart Rate:  [] 92  Resp:  [18-22] 18  BP: ()/(51-68) 104/60    Physical Exam  Constitutional: He is oriented to person, place, and time. He appears well-developed and well-nourished.   HENT:   Head: Normocephalic and atraumatic.   Nose: Nose normal.   Mouth/Throat: Oropharynx is clear and moist.   Eyes: Pupils are equal, round, and reactive to light. Conjunctivae and EOM are normal. Right eye exhibits no discharge. Left eye exhibits no discharge. No scleral icterus.   Neck: Normal range of motion. Neck supple. No tracheal deviation present.   Cardiovascular: Normal rate, regular rhythm and normal heart sounds.   No murmur heard.  Pulmonary/Chest: Effort normal and breath sounds normal. No stridor. No respiratory distress. He has no wheezes. He has no rales.   Abdominal: Soft. Bowel sounds are normal. He exhibits no distension and no mass. There is tenderness (mild) in the periumbilical area. There is no rebound and no guarding.   Musculoskeletal: He exhibits no edema.   Neurological:  He is alert and oriented to person, place, and time. Coordination normal.   Skin: Skin is warm and dry. No rash noted. No erythema.   Psychiatric: He has a normal mood and affect. His behavior is normal. Thought content normal.   Nursing note and vitals reviewed      Results Review:  I have reviewed the labs, radiology results, and diagnostic studies.    Laboratory Data:   Results from last 7 days   Lab Units 05/01/20  0504 04/30/20  1356   SODIUM mmol/L 131* 129*   POTASSIUM mmol/L 3.9 4.2   CHLORIDE mmol/L 97* 89*   CO2 mmol/L 20.0* 24.0   BUN mg/dL 27* 31*   CREATININE mg/dL 1.00 1.52*   GLUCOSE mg/dL 132* 130*   CALCIUM mg/dL 8.2* 9.2   BILIRUBIN mg/dL  --  0.4   ALK PHOS U/L  --  92   ALT (SGPT) U/L  --  13   AST (SGOT) U/L  --  27   ANION GAP mmol/L 14.0 16.0*     Estimated Creatinine Clearance: 124.4 mL/min (by C-G formula based on SCr of 1 mg/dL).  Results from last 7 days   Lab Units 05/01/20  0504 04/30/20  1356   MAGNESIUM mg/dL 2.8* 2.8*   PHOSPHORUS mg/dL  --  3.6         Results from last 7 days   Lab Units 05/01/20  0504 04/30/20  1350   WBC 10*3/mm3 23.15* 27.20*   HEMOGLOBIN g/dL 10.6* 12.4*   HEMATOCRIT % 30.8* 35.9*   PLATELETS 10*3/mm3 335 400     Results from last 7 days   Lab Units 04/30/20  1350   INR  1.25*       Culture Data:   Blood Culture   Date Value Ref Range Status   04/30/2020 Abnormal Stain (C)  Preliminary     Urine Culture   Date Value Ref Range Status   04/30/2020 No growth  Preliminary     No results found for: RESPCX  No results found for: WOUNDCX  No results found for: STOOLCX  No components found for: BODYFLD    Radiology Data:   Imaging Results (Last 24 Hours)     Procedure Component Value Units Date/Time    XR Abdomen KUB [[id number]] Collected:  04/30/20 1323     Updated:  04/30/20 1417    Narrative:       EXAM DESCRIPTION:     XR ABDOMEN KUB    CLINICAL HISTORY:     39 years  Male  diarrhea    COMPARISON:     June 2018    TECHNIQUE:     One view-AP radiograph of the  abdomen    FINDINGS:     The bowel gas pattern is nonobstructive. There is gas throughout  the colon. The pelvis is not included on this exam. There are no  dystrophic calcifications seen. The regional bony structures are  within normal limits.      Impression:           1. Nonspecific/nonobstructive bowel gas pattern.        Electronically signed by:  Rakel Ellison MD  4/30/2020 2:16 PM  CDT Workstation: [id number]    XR Chest 1 View [[id number]] Collected:  04/30/20 1323     Updated:  04/30/20 1416    Narrative:       EXAM DESCRIPTION:     XR CHEST 1 VW    CLINICAL HISTORY:     39 years  Male  Severe Sepsis triage protocol    COMPARISON:     June 6, 2018    TECHNIQUE:     One view-AP portable radiograph of the chest    FINDINGS:     The lungs are well-expanded and clear. The cardiac silhouette and  pulmonary vasculature are within normal limits. There are no  pleural effusions.      Impression:           1. Stable appearance of the chest without radiographic evidence  of acute cardiopulmonary disease.        Electronically signed by:  Rakel Ellison MD  4/30/2020 2:15 PM  CDT Workstation: [id number]          I have reviewed the patient's current medications.     Assessment/Plan     Active Hospital Problems    Diagnosis   • Hypotension       Plan:      Hypotension  -like due to diarrhea, improved now after ivf    Bactremia  -1/2 coag negative staph  -clincially improving so cw rocpehin and flagyl for now     Diarrhea  -with abdominal pain, improving  -abd kub shows Nonspecific/nonobstructive bowel gas pattern, if worsens, consider ct abd/pelvis  -ED ordered gi panel  -cw ivf  -will give empiric abx tx with iv rocpehin and flagyl  -will give probiotic  -COVID-19 testing results pending, will place pt in enhanced droplet, contact precaution, will give pt vitamin c and zinc   -cw supportive care     Elevated CRP  -trending down  -monitor crp      Bipolar d/o   -controlled  -cw home meds     Chronic undifferentiated  schizophrenia  -controlled  -cw home meds     Tobacco abuse  -cessation encouraged, pt expresses undering     dvt ppx lovenox

## 2020-05-01 NOTE — PLAN OF CARE
Problem: Patient Care Overview  Goal: Plan of Care Review  Outcome: Ongoing (interventions implemented as appropriate)  Flowsheets (Taken 5/1/2020 1103)  Plan of Care Reviewed With: patient  Note:   Reg diet. Current wt 197#/BMI 26.7. Pt reports decreased appetite w/ diarrhea but both of those are improved this am. States #. RD following.

## 2020-05-02 LAB
BACTERIA SPEC AEROBE CULT: ABNORMAL
BACTERIA SPEC AEROBE CULT: NO GROWTH
GRAM STN SPEC: ABNORMAL
GRAM STN SPEC: ABNORMAL
ISOLATED FROM: ABNORMAL

## 2020-05-02 PROCEDURE — 25010000002 CEFTRIAXONE PER 250 MG: Performed by: INTERNAL MEDICINE

## 2020-05-02 PROCEDURE — 87040 BLOOD CULTURE FOR BACTERIA: CPT | Performed by: INTERNAL MEDICINE

## 2020-05-02 RX ADMIN — CEFTRIAXONE 1 G: 1 INJECTION, POWDER, FOR SOLUTION INTRAMUSCULAR; INTRAVENOUS at 20:30

## 2020-05-02 RX ADMIN — ZINC SULFATE 220 MG (50 MG) CAPSULE 220 MG: CAPSULE at 08:36

## 2020-05-02 RX ADMIN — METRONIDAZOLE 500 MG: 500 TABLET ORAL at 22:23

## 2020-05-02 RX ADMIN — DIVALPROEX SODIUM 500 MG: 500 TABLET, FILM COATED, EXTENDED RELEASE ORAL at 20:30

## 2020-05-02 RX ADMIN — LACTOBACILLUS TAB 1 TABLET: TAB at 08:36

## 2020-05-02 RX ADMIN — METRONIDAZOLE 500 MG: 500 TABLET ORAL at 15:42

## 2020-05-02 RX ADMIN — SODIUM CHLORIDE, PRESERVATIVE FREE 10 ML: 5 INJECTION INTRAVENOUS at 08:37

## 2020-05-02 RX ADMIN — Medication 500 MG: at 08:36

## 2020-05-02 RX ADMIN — SODIUM CHLORIDE 150 ML/HR: 900 INJECTION, SOLUTION INTRAVENOUS at 15:42

## 2020-05-02 RX ADMIN — SODIUM CHLORIDE, PRESERVATIVE FREE 10 ML: 5 INJECTION INTRAVENOUS at 20:30

## 2020-05-02 RX ADMIN — SODIUM CHLORIDE 150 ML/HR: 900 INJECTION, SOLUTION INTRAVENOUS at 06:00

## 2020-05-02 RX ADMIN — ZIPRASIDONE HCL 80 MG: 40 CAPSULE ORAL at 08:36

## 2020-05-02 RX ADMIN — METRONIDAZOLE 500 MG: 500 TABLET ORAL at 06:00

## 2020-05-02 RX ADMIN — ZIPRASIDONE HCL 80 MG: 40 CAPSULE ORAL at 18:01

## 2020-05-02 RX ADMIN — SODIUM CHLORIDE 150 ML/HR: 900 INJECTION, SOLUTION INTRAVENOUS at 20:23

## 2020-05-02 RX ADMIN — DIVALPROEX SODIUM 500 MG: 500 TABLET, FILM COATED, EXTENDED RELEASE ORAL at 08:37

## 2020-05-02 NOTE — PROGRESS NOTES
[facility] Medicine Services  INPATIENT PROGRESS NOTE    Length of Stay: 1  Date of Admission: 4/30/2020  Primary Care Physician: Jatin Tamayo MD    Subjective   Chief Complaint:diarrhea     HPI: 40 yo male with past medical hx of bipolar d/o chronic, undifferentiated schizophrenia, tobacco abuse comes to the hospital due to abdominal pain, nausea and diarrhea for the past 2 days. Pt denies fever, fatigue, vomiting, dysuria, myalgias or rash. Pt also c/o generalized low back pain. Pt states he has been in contact with people who were tested positive for COVID-19.     covid-19 negative  Blood cx 2/4 positive,    Diarrhea improved      Review of Systems     All pertinent negatives and positives are as above. All other systems have been reviewed and are negative unless otherwise stated.     Objective    Temp:  [96.7 °F (35.9 °C)-99.1 °F (37.3 °C)] 97.6 °F (36.4 °C)  Heart Rate:  [] 67  Resp:  [16-18] 18  BP: ()/(52-72) 114/66    Physical Exam  Constitutional: He is oriented to person, place, and time. He appears well-developed and well-nourished.   HENT:   Head: Normocephalic and atraumatic.   Nose: Nose normal.   Mouth/Throat: Oropharynx is clear and moist.   Eyes: Pupils are equal, round, and reactive to light. Conjunctivae and EOM are normal. Right eye exhibits no discharge. Left eye exhibits no discharge. No scleral icterus.   Neck: Normal range of motion. Neck supple. No tracheal deviation present.   Cardiovascular: Normal rate, regular rhythm and normal heart sounds.   No murmur heard.  Pulmonary/Chest: Effort normal and breath sounds normal. No stridor. No respiratory distress. He has no wheezes. He has no rales.   Abdominal: Soft. Bowel sounds are normal. He exhibits no distension and no mass. There is tenderness (mild) in the periumbilical area. There is no rebound and no guarding.   Musculoskeletal: He exhibits no edema.   Neurological: He is alert and  oriented to person, place, and time. Coordination normal.   Skin: Skin is warm and dry. No rash noted. No erythema.   Psychiatric: He has a normal mood and affect. His behavior is normal. Thought content normal.   Nursing note and vitals reviewed      Results Review:  I have reviewed the labs, radiology results, and diagnostic studies.    Laboratory Data:   Results from last 7 days   Lab Units 05/01/20  0504 04/30/20  1356   SODIUM mmol/L 131* 129*   POTASSIUM mmol/L 3.9 4.2   CHLORIDE mmol/L 97* 89*   CO2 mmol/L 20.0* 24.0   BUN mg/dL 27* 31*   CREATININE mg/dL 1.00 1.52*   GLUCOSE mg/dL 132* 130*   CALCIUM mg/dL 8.2* 9.2   BILIRUBIN mg/dL  --  0.4   ALK PHOS U/L  --  92   ALT (SGPT) U/L  --  13   AST (SGOT) U/L  --  27   ANION GAP mmol/L 14.0 16.0*     Estimated Creatinine Clearance: 123.5 mL/min (by C-G formula based on SCr of 1 mg/dL).  Results from last 7 days   Lab Units 05/01/20  0504 04/30/20  1356   MAGNESIUM mg/dL 2.8* 2.8*   PHOSPHORUS mg/dL  --  3.6         Results from last 7 days   Lab Units 05/01/20  0504 04/30/20  1350   WBC 10*3/mm3 23.15* 27.20*   HEMOGLOBIN g/dL 10.6* 12.4*   HEMATOCRIT % 30.8* 35.9*   PLATELETS 10*3/mm3 335 400     Results from last 7 days   Lab Units 04/30/20  1350   INR  1.25*       Culture Data:   Blood Culture   Date Value Ref Range Status   04/30/2020 Staphylococcus, coagulase negative (C)  Corrected     Comment:     Probable contaminant requires clinical correlation, susceptibility not performed unless requested by physician.     04/30/2020 No growth at 24 hours  Preliminary     Urine Culture   Date Value Ref Range Status   04/30/2020 No growth  Final     No results found for: RESPCX  No results found for: WOUNDCX  No results found for: STOOLCX  No components found for: BODYFLD    Radiology Data:   Imaging Results (Last 24 Hours)     ** No results found for the last 24 hours. **          I have reviewed the patient's current medications.     Assessment/Plan     Active  Hospital Problems    Diagnosis   • Hypotension       Plan:        Hypotension  -like due to diarrhea  - improved now after ivf     Bactremia  -2/4 positive, will repeat blood cx  -clincially improving so cw rocpehin and flagyl for now     Diarrhea  -with abdominal pain, improving  -abd kub shows Nonspecific/nonobstructive bowel gas pattern, if worsens, consider ct abd/pelvis  -ED ordered gi panel  -cw ivf  -will give empiric abx tx with iv rocpehin and flagyl  -will give probiotic  -COVID-19 testing results pending, will place pt in enhanced droplet, contact precaution, will give pt vitamin c and zinc   -cw supportive care     Elevated CRP  -trending down  -monitor crp      Bipolar d/o   -controlled  -cw home meds     Chronic undifferentiated schizophrenia  -controlled  -cw home meds     Tobacco abuse  -cessation encouraged, pt expresses undering     dvt ppx lovenox

## 2020-05-02 NOTE — NURSING NOTE
When rounding, signee noted several empty cups on bedside table; CNA stated pt requesting coffee, soda and water during the night.

## 2020-05-02 NOTE — PLAN OF CARE
Problem: Patient Care Overview  Goal: Plan of Care Review  Outcome: Ongoing (interventions implemented as appropriate)  Flowsheets (Taken 5/2/2020 0125)  Progress: improving  Outcome Summary: Pt awake throughout the night; voiced no complaints; vital signs stable

## 2020-05-03 LAB
ANION GAP SERPL CALCULATED.3IONS-SCNC: 11 MMOL/L (ref 5–15)
BASOPHILS # BLD AUTO: 0.11 10*3/MM3 (ref 0–0.2)
BASOPHILS NFR BLD AUTO: 0.7 % (ref 0–1.5)
BUN BLD-MCNC: 4 MG/DL (ref 6–20)
BUN/CREAT SERPL: 4.9 (ref 7–25)
CALCIUM SPEC-SCNC: 8.5 MG/DL (ref 8.6–10.5)
CHLORIDE SERPL-SCNC: 107 MMOL/L (ref 98–107)
CO2 SERPL-SCNC: 21 MMOL/L (ref 22–29)
CREAT BLD-MCNC: 0.81 MG/DL (ref 0.76–1.27)
CRP SERPL-MCNC: 5.19 MG/DL (ref 0–0.5)
DEPRECATED RDW RBC AUTO: 46.1 FL (ref 37–54)
EOSINOPHIL # BLD AUTO: 0.12 10*3/MM3 (ref 0–0.4)
EOSINOPHIL NFR BLD AUTO: 0.8 % (ref 0.3–6.2)
ERYTHROCYTE [DISTWIDTH] IN BLOOD BY AUTOMATED COUNT: 14.7 % (ref 12.3–15.4)
GFR SERPL CREATININE-BSD FRML MDRD: 128 ML/MIN/1.73
GLUCOSE BLD-MCNC: 108 MG/DL (ref 65–99)
HCT VFR BLD AUTO: 31.8 % (ref 37.5–51)
HGB BLD-MCNC: 11 G/DL (ref 13–17.7)
IMM GRANULOCYTES # BLD AUTO: 1.35 10*3/MM3 (ref 0–0.05)
IMM GRANULOCYTES NFR BLD AUTO: 8.5 % (ref 0–0.5)
LYMPHOCYTES # BLD AUTO: 2.24 10*3/MM3 (ref 0.7–3.1)
LYMPHOCYTES NFR BLD AUTO: 14.1 % (ref 19.6–45.3)
MAGNESIUM SERPL-MCNC: 2.2 MG/DL (ref 1.6–2.6)
MCH RBC QN AUTO: 29.8 PG (ref 26.6–33)
MCHC RBC AUTO-ENTMCNC: 34.6 G/DL (ref 31.5–35.7)
MCV RBC AUTO: 86.2 FL (ref 79–97)
MONOCYTES # BLD AUTO: 2.67 10*3/MM3 (ref 0.1–0.9)
MONOCYTES NFR BLD AUTO: 16.9 % (ref 5–12)
NEUTROPHILS # BLD AUTO: 9.35 10*3/MM3 (ref 1.7–7)
NEUTROPHILS NFR BLD AUTO: 59 % (ref 42.7–76)
NRBC BLD AUTO-RTO: 0 /100 WBC (ref 0–0.2)
PLATELET # BLD AUTO: 458 10*3/MM3 (ref 140–450)
PMV BLD AUTO: 9.8 FL (ref 6–12)
POTASSIUM BLD-SCNC: 4.1 MMOL/L (ref 3.5–5.2)
RBC # BLD AUTO: 3.69 10*6/MM3 (ref 4.14–5.8)
SODIUM BLD-SCNC: 139 MMOL/L (ref 136–145)
WBC NRBC COR # BLD: 15.84 10*3/MM3 (ref 3.4–10.8)

## 2020-05-03 PROCEDURE — 25010000002 CEFTRIAXONE PER 250 MG: Performed by: INTERNAL MEDICINE

## 2020-05-03 PROCEDURE — 85025 COMPLETE CBC W/AUTO DIFF WBC: CPT | Performed by: INTERNAL MEDICINE

## 2020-05-03 PROCEDURE — 80048 BASIC METABOLIC PNL TOTAL CA: CPT | Performed by: INTERNAL MEDICINE

## 2020-05-03 PROCEDURE — 86140 C-REACTIVE PROTEIN: CPT | Performed by: INTERNAL MEDICINE

## 2020-05-03 PROCEDURE — 83735 ASSAY OF MAGNESIUM: CPT | Performed by: INTERNAL MEDICINE

## 2020-05-03 RX ADMIN — SODIUM CHLORIDE 150 ML/HR: 900 INJECTION, SOLUTION INTRAVENOUS at 22:44

## 2020-05-03 RX ADMIN — DIVALPROEX SODIUM 500 MG: 500 TABLET, FILM COATED, EXTENDED RELEASE ORAL at 22:43

## 2020-05-03 RX ADMIN — METRONIDAZOLE 500 MG: 500 TABLET ORAL at 14:59

## 2020-05-03 RX ADMIN — CEFTRIAXONE 1 G: 1 INJECTION, POWDER, FOR SOLUTION INTRAMUSCULAR; INTRAVENOUS at 22:42

## 2020-05-03 RX ADMIN — DIVALPROEX SODIUM 500 MG: 500 TABLET, FILM COATED, EXTENDED RELEASE ORAL at 07:44

## 2020-05-03 RX ADMIN — ZINC SULFATE 220 MG (50 MG) CAPSULE 220 MG: CAPSULE at 07:45

## 2020-05-03 RX ADMIN — METRONIDAZOLE 500 MG: 500 TABLET ORAL at 05:07

## 2020-05-03 RX ADMIN — SODIUM CHLORIDE 150 ML/HR: 900 INJECTION, SOLUTION INTRAVENOUS at 04:29

## 2020-05-03 RX ADMIN — METRONIDAZOLE 500 MG: 500 TABLET ORAL at 22:45

## 2020-05-03 RX ADMIN — Medication 500 MG: at 07:45

## 2020-05-03 RX ADMIN — METRONIDAZOLE 500 MG: 500 TABLET ORAL at 07:45

## 2020-05-03 RX ADMIN — ZIPRASIDONE HCL 80 MG: 40 CAPSULE ORAL at 07:45

## 2020-05-03 RX ADMIN — ZIPRASIDONE HCL 80 MG: 40 CAPSULE ORAL at 18:08

## 2020-05-03 RX ADMIN — LACTOBACILLUS TAB 1 TABLET: TAB at 09:45

## 2020-05-03 NOTE — PLAN OF CARE
Problem: Patient Care Overview  Goal: Plan of Care Review  Outcome: Ongoing (interventions implemented as appropriate)   Vss, no distress. Wants to go home today.

## 2020-05-03 NOTE — PLAN OF CARE
Problem: Patient Care Overview  Goal: Plan of Care Review  Outcome: Ongoing (interventions implemented as appropriate)  Flowsheets (Taken 5/3/2020 8717)  Progress: no change  Outcome Summary: Pt denies having diarrhea stools overnight; vital signs stable

## 2020-05-03 NOTE — PROGRESS NOTES
[facility] Medicine Services  INPATIENT PROGRESS NOTE    Length of Stay: 2  Date of Admission: 4/30/2020  Primary Care Physician: Jatin Tamayo MD    Subjective   Chief Complaint: diarrhea     HPI: 40 yo male with past medical hx of bipolar d/o chronic, undifferentiated schizophrenia, tobacco abuse comes to the hospital due to abdominal pain, nausea and diarrhea for the past 2 days. Pt denies fever, fatigue, vomiting, dysuria, myalgias or rash. Pt also c/o generalized low back pain. Pt states he has been in contact with people who were tested positive for COVID-19.     covid-19 negative  Blood cx 2/4 positive,     Diarrhea improved     Pt is eating [place] better  Review of Systems     All pertinent negatives and positives are as above. All other systems have been reviewed and are negative unless otherwise stated.     Objective    Temp:  [97.5 °F (36.4 °C)-98.4 °F (36.9 °C)] 97.5 °F (36.4 °C)  Heart Rate:  [72-87] 72  Resp:  [18] 18  BP: ()/(56-72) 115/65    Physical Exam  Constitutional: He is oriented to person, place, and time. He appears well-developed and well-nourished.   HENT:   Head: Normocephalic and atraumatic.   Nose: Nose normal.   Mouth/Throat: Oropharynx is clear and moist.   Eyes: Pupils are equal, round, and reactive to light. Conjunctivae and EOM are normal. Right eye exhibits no discharge. Left eye exhibits no discharge. No scleral icterus.   Neck: Normal range of motion. Neck supple. No tracheal deviation present.   Cardiovascular: Normal rate, regular rhythm and normal heart sounds.   No murmur heard.  Pulmonary/Chest: Effort normal and breath sounds normal. No stridor. No respiratory distress. He has no wheezes. He has no rales.   Abdominal: Soft. Bowel sounds are normal. He exhibits no distension and no mass. There is tenderness (mild) in the periumbilical area. There is no rebound and no guarding.   Musculoskeletal: He exhibits no edema.    Neurological: He is alert and oriented to person, place, and time. Coordination normal.   Skin: Skin is warm and dry. No rash noted. No erythema.   Psychiatric: He has a normal mood and affect. His behavior is normal. Thought content normal.   Nursing note and vitals reviewed      Results Review:  I have reviewed the labs, radiology results, and diagnostic studies.    Laboratory Data:   Results from last 7 days   Lab Units 05/03/20  0530 05/01/20  0504 04/30/20  1356   SODIUM mmol/L 139 131* 129*   POTASSIUM mmol/L 4.1 3.9 4.2   CHLORIDE mmol/L 107 97* 89*   CO2 mmol/L 21.0* 20.0* 24.0   BUN mg/dL 4* 27* 31*   CREATININE mg/dL 0.81 1.00 1.52*   GLUCOSE mg/dL 108* 132* 130*   CALCIUM mg/dL 8.5* 8.2* 9.2   BILIRUBIN mg/dL  --   --  0.4   ALK PHOS U/L  --   --  92   ALT (SGPT) U/L  --   --  13   AST (SGOT) U/L  --   --  27   ANION GAP mmol/L 11.0 14.0 16.0*     Estimated Creatinine Clearance: 159.6 mL/min (by C-G formula based on SCr of 0.81 mg/dL).  Results from last 7 days   Lab Units 05/03/20  0530 05/01/20  0504 04/30/20  1356   MAGNESIUM mg/dL 2.2 2.8* 2.8*   PHOSPHORUS mg/dL  --   --  3.6         Results from last 7 days   Lab Units 05/03/20  0914 05/01/20  0504 04/30/20  1350   WBC 10*3/mm3 15.84* 23.15* 27.20*   HEMOGLOBIN g/dL 11.0* 10.6* 12.4*   HEMATOCRIT % 31.8* 30.8* 35.9*   PLATELETS 10*3/mm3 458* 335 400     Results from last 7 days   Lab Units 04/30/20  1350   INR  1.25*       Culture Data:   Blood Culture   Date Value Ref Range Status   05/02/2020 No growth at 24 hours  Preliminary   05/02/2020 No growth at 24 hours  Preliminary     No results found for: URINECX  No results found for: RESPCX  No results found for: WOUNDCX  No results found for: STOOLCX  No components found for: BODYFLD    Radiology Data:   Imaging Results (Last 24 Hours)     ** No results found for the last 24 hours. **          I have reviewed the patient's current medications.     Assessment/Plan     Active Hospital Problems     Diagnosis   • Hypotension       Plan:      Hypotension  -like due to diarrhea  - improved now after ivf  -can likely be discharge tomorrow if continues to clinically improve     Bactremia  -2/4 positive, will repeat blood cx  -clincially improving so cw rocpehin and flagyl for now     Diarrhea  -with abdominal pain, improving  -abd kub shows Nonspecific/nonobstructive bowel gas pattern, if worsens, consider ct abd/pelvis  - gi panel and cdiff negative  -cw ivf and increase diet as tolerates  -will give empiric abx tx with iv rocpehin and flagyl  -will give probiotic  -COVID-19 testing negative,  -cw vitamin c and zinc   -cw supportive care     Elevated CRP  -trending down  -monitor crp      Bipolar d/o   -controlled  -cw home meds     Chronic undifferentiated schizophrenia  -controlled  -cw home meds     Tobacco abuse  -cessation encouraged, pt expresses undering     dvt ppx lovenox

## 2020-05-04 ENCOUNTER — READMISSION MANAGEMENT (OUTPATIENT)
Dept: CALL CENTER | Facility: HOSPITAL | Age: 40
End: 2020-05-04

## 2020-05-04 VITALS
WEIGHT: 204.6 LBS | HEIGHT: 72 IN | HEART RATE: 74 BPM | RESPIRATION RATE: 18 BRPM | SYSTOLIC BLOOD PRESSURE: 97 MMHG | OXYGEN SATURATION: 96 % | TEMPERATURE: 97.9 F | DIASTOLIC BLOOD PRESSURE: 67 MMHG | BODY MASS INDEX: 27.71 KG/M2

## 2020-05-04 PROBLEM — R19.7 DIARRHEA: Status: ACTIVE | Noted: 2020-05-04

## 2020-05-04 RX ORDER — L. ACIDOPHILUS/L.BULGARICUS 1MM CELL
1 TABLET ORAL DAILY
Qty: 15 TABLET | Refills: 1 | Status: SHIPPED | OUTPATIENT
Start: 2020-05-05 | End: 2020-05-26 | Stop reason: SINTOL

## 2020-05-04 RX ORDER — ASCORBIC ACID 500 MG
500 TABLET ORAL DAILY
Qty: 30 TABLET | Refills: 1 | Status: SHIPPED | OUTPATIENT
Start: 2020-05-05 | End: 2020-08-20

## 2020-05-04 RX ADMIN — SODIUM CHLORIDE 150 ML/HR: 900 INJECTION, SOLUTION INTRAVENOUS at 06:11

## 2020-05-04 RX ADMIN — Medication 500 MG: at 08:40

## 2020-05-04 RX ADMIN — DIVALPROEX SODIUM 500 MG: 500 TABLET, FILM COATED, EXTENDED RELEASE ORAL at 08:41

## 2020-05-04 RX ADMIN — METRONIDAZOLE 500 MG: 500 TABLET ORAL at 06:11

## 2020-05-04 RX ADMIN — ZIPRASIDONE HCL 80 MG: 40 CAPSULE ORAL at 08:40

## 2020-05-04 RX ADMIN — LACTOBACILLUS TAB 1 TABLET: TAB at 08:40

## 2020-05-04 RX ADMIN — ZINC SULFATE 220 MG (50 MG) CAPSULE 220 MG: CAPSULE at 08:40

## 2020-05-04 NOTE — OUTREACH NOTE
Prep Survey      Responses   [facility] patient discharged from?  [facility]   Is LACE score < 7 ?  No   Eligibility  [facility]   Date of Admission  04/30/20   Date of Discharge  05/04/20   Discharge Disposition  Home-Health Care [facility]   Discharge diagnosis  hyoptension d/t diarrhea, bacteremia   COVID-19 Test Status  Negative   Does the patient have one of the following disease processes/diagnoses(primary or secondary)?  Other   Does the patient have Home health ordered?  No   Is there a DME ordered?  No   Prep survey completed?  Yes          Nohelia Jeffrey RN

## 2020-05-04 NOTE — DISCHARGE SUMMARY
Discharge Summary  Rodríguez Medina MD  Hospitalist     Date of Discharge:  5/4/2020    Discharge Diagnosis:      Diarrhea    Hypotension    Bipolar disorder (CMS/HCC)    Chronic undifferentiated schizophrenia (CMS/HCC)      Presenting Problem/History of Present Illness  Diarrhea     Hospital Course  Patient is a 39 y.o. male admitted for diarrhea / hypotension, improved with the help of IV hydration and symptomatic therapy. His stool studies were negative and he is now able to tolerate a regular diet and his vital signs are stable.    Consults:   Consults     Date and Time Order Name Status Description    4/30/2020 1512 Hospitalist (on-call MD unless specified)            Pertinent Test Results: negative stool studies / negative Coronavirus screen. His initial blood cultures isolated coagulase negative Staphylococcus in 2 out of 4 bottles, but the follow up ones are negative after 2 days.    Condition on Discharge:  stable    Vital Signs  Temp:  [97.5 °F (36.4 °C)-98.1 °F (36.7 °C)] 97.8 °F (36.6 °C)  Heart Rate:  [] 70  Resp:  [18] 18  BP: ()/(49-78) 108/78    Physical Exam:  Physical Exam   Constitutional: He is oriented to person, place, and time. He appears well-developed and well-nourished. No distress.   HENT:   Head: Normocephalic and atraumatic.   Eyes: Pupils are equal, round, and reactive to light. EOM are normal. No scleral icterus.   Neck: Normal range of motion. Neck supple.   Cardiovascular: Normal rate and regular rhythm.   Pulmonary/Chest: Effort normal and breath sounds normal. No stridor. No respiratory distress. He has no wheezes. He has no rales.   Abdominal: Soft. Bowel sounds are normal. He exhibits no distension. There is no tenderness.   Musculoskeletal: Normal range of motion. He exhibits no edema or tenderness.   Neurological: He is alert and oriented to person, place, and time. No cranial nerve deficit. Coordination normal.   Skin: Skin is warm and dry. No rash noted. No  erythema. No pallor.   Psychiatric: He has a normal mood and affect. His behavior is normal.   Vitals reviewed.      Discharge Disposition  Home or Self Care    Discharge Medications     Discharge Medications      New Medications      Instructions Start Date   acidophilus tablet tablet   1 tablet, Oral, Daily   Start Date:  May 5, 2020     ascorbic acid 500 MG tablet  Commonly known as:  VITAMIN C   500 mg, Oral, Daily   Start Date:  May 5, 2020        Changes to Medications      Instructions Start Date   Geodon 80 MG capsule  Generic drug:  ziprasidone  What changed:  Another medication with the same name was removed. Continue taking this medication, and follow the directions you see here.   80 mg, Oral, 2 Times Daily         Continue These Medications      Instructions Start Date   divalproex 500 MG 24 hr tablet  Commonly known as:  DEPAKOTE   tablet, extended release 24 hr 2 times per day Oral      sucralfate 1 g tablet  Commonly known as:  Carafate   1 g, Oral, 4 Times Daily, X 4wks         Stop These Medications    escitalopram 10 MG tablet  Commonly known as:  LEXAPRO            Discharge Diet:   Diet Instructions     Diet: Regular      Discharge Diet:  Regular          Activity at Discharge:   Activity Instructions     Activity as Tolerated            Follow-up Appointments  No future appointments.  Additional Instructions for the Follow-ups that You Need to Schedule     Discharge Follow-up with PCP   As directed       Currently Documented PCP:    Jatin Tamayo MD    PCP Phone Number:    [phone]     Follow Up Details:  in 1 week               Test Results Pending at Discharge   Order Current Status    Blood Culture - Blood, Arm, Right Preliminary result    Blood Culture - Blood, Hand, Left Preliminary result    Blood Culture - Blood, Hand, Right Preliminary result           Rodríguez Medina MD  05/04/20  10:47

## 2020-05-04 NOTE — PLAN OF CARE
Problem: Patient Care Overview  Goal: Plan of Care Review  Outcome: Ongoing (interventions implemented as appropriate)  Flowsheets (Taken 5/4/2020 2554)  Progress: improving  Plan of Care Reviewed With: patient  Outcome Summary: Pt denies having diarrhea stools overnight, no changes, vital signs stable

## 2020-05-05 ENCOUNTER — TRANSITIONAL CARE MANAGEMENT TELEPHONE ENCOUNTER (OUTPATIENT)
Dept: CALL CENTER | Facility: HOSPITAL | Age: 40
End: 2020-05-05

## 2020-05-05 LAB — BACTERIA SPEC AEROBE CULT: NORMAL

## 2020-05-05 NOTE — OUTREACH NOTE
Call Center TCM Note      Responses   [facility]   COVID-19 Test Status  Negative   Does the patient have one of the following disease processes/diagnoses(primary or secondary)?  Other   TCM attempt successful?  Yes   Call start time  1217   Call end time  1227   Discharge diagnosis  hyoptension d/t diarrhea, bacteremia   Is patient permission given to speak with other caregiver?  Yes   List who call center can speak with  Bianca berkowitz   Person spoke with today (if not patient) and relationship  Bianca berkowitz   Meds reviewed with patient/caregiver?  Yes   Is the patient having any side effects they believe may be caused by any medication additions or changes?  Yes   Side effects comments   diarrahea - is taking OTC immodium   Does the patient have all medications ordered at discharge?  No   What is keeping the patient from filling the prescriptions?  Doesn't understand reason for taking   Nursing Interventions  Nurse provided patient education   Medication comments  Bianca Berkowitz does not understand Geodon instructions on AVS. It indictes twice a day, with only 1 check. It also has him taking 80 mg twice daily, but she says he takes 120 twice daily. Wants to know why it was decreased. She also wants to know why his Lexapro was stopped.  Also wants to know if there is a prescription for something he can take for diarrahea   Does the patient have a primary care provider?   Yes   Does the patient have an appointment with their PCP within 7 days of discharge?  Yes   Comments  wamts to talk to MD prior to appt   Did the patient receive a copy of their discharge instructions?  Yes   Nursing interventions  Reviewed instructions with patient   What is the patient's perception of their health status since discharge?  Same   Is the patient/caregiver able to teach back signs and symptoms related to disease process for when to call PCP?  Yes   Is the patient/caregiver able to teach back  signs and symptoms related to disease process for when to call 911?  Yes   Is the patient/caregiver able to teach back the hierarchy of who to call/visit for symptoms/problems? PCP, Specialist, Home health nurse, Urgent Care, ED, 911  Yes   TCM call completed?  Yes   Wrap up additional comments  he continues with david, was unable to make it home from the facility without a mess          Chelita Payne RN    5/5/2020, 12:33

## 2020-05-05 NOTE — OUTREACH NOTE
Call Center TCM Note      Responses   [facility] patient discharged from?  [place]   COVID-19 Test Status  Negative   Does the patient have one of the following disease processes/diagnoses(primary or secondary)?  Other   TCM attempt successful?  No   Unsuccessful attempts  Attempt 1          Chelita Payne RN    5/5/2020, 10:40

## 2020-05-07 LAB
BACTERIA SPEC AEROBE CULT: NORMAL
BACTERIA SPEC AEROBE CULT: NORMAL

## 2020-05-14 ENCOUNTER — READMISSION MANAGEMENT (OUTPATIENT)
Dept: CALL CENTER | Facility: HOSPITAL | Age: 40
End: 2020-05-14

## 2020-05-14 NOTE — OUTREACH NOTE
Medical Week 2 Survey      Responses   [facility] patient discharged from?  [place]   COVID-19 Test Status  Negative   Does the patient have one of the following disease processes/diagnoses(primary or secondary)?  Other   Week 2 attempt successful?  No   Unsuccessful attempts  Attempt 1          Emmie Jesus RN

## 2020-05-18 ENCOUNTER — READMISSION MANAGEMENT (OUTPATIENT)
Dept: CALL CENTER | Facility: HOSPITAL | Age: 40
End: 2020-05-18

## 2020-05-18 NOTE — OUTREACH NOTE
Medical Week 2 Survey      Responses   [facility] patient discharged from?  [place]   COVID-19 Test Status  Negative   Does the patient have one of the following disease processes/diagnoses(primary or secondary)?  Other   Week 2 attempt successful?  No   Unsuccessful attempts  Attempt 2          Zohreh Garibay RN

## 2020-05-26 ENCOUNTER — OFFICE VISIT (OUTPATIENT)
Dept: FAMILY MEDICINE CLINIC | Facility: CLINIC | Age: 40
End: 2020-05-26

## 2020-05-26 ENCOUNTER — READMISSION MANAGEMENT (OUTPATIENT)
Dept: CALL CENTER | Facility: HOSPITAL | Age: 40
End: 2020-05-26

## 2020-05-26 VITALS
DIASTOLIC BLOOD PRESSURE: 84 MMHG | OXYGEN SATURATION: 99 % | HEIGHT: 72 IN | WEIGHT: 203.7 LBS | HEART RATE: 101 BPM | SYSTOLIC BLOOD PRESSURE: 124 MMHG | BODY MASS INDEX: 27.59 KG/M2

## 2020-05-26 DIAGNOSIS — R19.7 DIARRHEA, UNSPECIFIED TYPE: Primary | ICD-10-CM

## 2020-05-26 DIAGNOSIS — F20.9 CHRONIC UNDIFFERENTIATED SCHIZOPHRENIA (HCC): ICD-10-CM

## 2020-05-26 DIAGNOSIS — E86.1 HYPOTENSION DUE TO HYPOVOLEMIA: ICD-10-CM

## 2020-05-26 DIAGNOSIS — F17.200 TOBACCO DEPENDENCE SYNDROME: Chronic | ICD-10-CM

## 2020-05-26 DIAGNOSIS — I95.89 HYPOTENSION DUE TO HYPOVOLEMIA: ICD-10-CM

## 2020-05-26 DIAGNOSIS — Z00.00 MEDICARE ANNUAL WELLNESS VISIT, INITIAL: ICD-10-CM

## 2020-05-26 PROBLEM — I95.9 HYPOTENSION: Status: RESOLVED | Noted: 2020-04-30 | Resolved: 2020-05-26

## 2020-05-26 PROCEDURE — G0438 PPPS, INITIAL VISIT: HCPCS | Performed by: FAMILY MEDICINE

## 2020-05-26 PROCEDURE — 99213 OFFICE O/P EST LOW 20 MIN: CPT | Performed by: FAMILY MEDICINE

## 2020-05-26 NOTE — PROGRESS NOTES
Subjective   Erickson Singh is a 39 y.o. male.  Delayed hospital follow-up.  Was hospitalized earlier in the month for diarrhea dehydration hypotension tachycardia just now making follow-up arrangements.  Parent states still smoking but trying to quit.  Unfortunately chronic undifferentiated schizophrenia makes it difficult to make inroads.  Is been having some problems with tachycardia which are related probably to smoking anxiety and his recent hypotension.  Discharge summary is reviewed.    History of Present Illness   HPI    The following portions of the patient's history were reviewed and updated as appropriate: allergies, current medications, past family history, past medical history, past social history, past surgical history and problem list.    Review of Systems  Review of Systems   Constitutional: Negative for activity change, appetite change, fatigue and unexpected weight change.   HENT: Negative for trouble swallowing and voice change.    Eyes: Negative for redness and visual disturbance.   Respiratory: Negative for cough and wheezing.    Cardiovascular: Negative for chest pain and palpitations.   Gastrointestinal: Negative for abdominal pain, constipation, diarrhea, nausea and vomiting.   Genitourinary: Negative for urgency.   Musculoskeletal: Negative for joint swelling.   Neurological: Negative for syncope and headaches.   Hematological: Negative for adenopathy.   Psychiatric/Behavioral: Negative for sleep disturbance. The patient is nervous/anxious.        Objective   Physical Exam  Physical Exam   Constitutional: He is oriented to person, place, and time. He appears well-developed.   HENT:   Head: Normocephalic.   Nose: Nose normal.   Eyes: Pupils are equal, round, and reactive to light.   Neck: Normal range of motion. No thyromegaly present.   Cardiovascular: Normal rate, regular rhythm, normal heart sounds and intact distal pulses. Exam reveals no gallop and no friction rub.   No murmur  "heard.  Rate 90   Pulmonary/Chest: Breath sounds normal.   Abdominal: Soft. He exhibits no distension and no mass. There is no tenderness.   Musculoskeletal: Normal range of motion.   Neurological: He is alert and oriented to person, place, and time. He has normal reflexes.   Skin: Skin is warm and dry.   Psychiatric: His affect is blunt. His speech is delayed. He is slowed. Cognition and memory are impaired.   Normal flat affect at present.  Continues to be somewhat tangential         Visit Vitals  /84 (BP Location: Left arm, Patient Position: Sitting)   Pulse 101   Ht 182.9 cm (72\")   Wt 92.4 kg (203 lb 11.2 oz)   SpO2 99%   BMI 27.63 kg/m²     Body mass index is 27.63 kg/m².      Assessment/Plan   Erickson was seen today for follow-up.    Diagnoses and all orders for this visit:    Diarrhea, unspecified type    Hypotension due to hypovolemia    Tobacco dependence syndrome    Chronic undifferentiated schizophrenia (CMS/[facility])    Medicare annual wellness visit, initial     on stopping smoking.  3-10m     Counseled on hydration counseled following up with all subspecialist recheck 6 months on smoking  Current outpatient and discharge medications have been reconciled for the patient.  Reviewed by: Jatin Tamayo MD    "

## 2020-05-26 NOTE — OUTREACH NOTE
Medical Week 3 Survey      Responses   [facility] patient discharged from?  [place]   COVID-19 Test Status  Negative   Does the patient have one of the following disease processes/diagnoses(primary or secondary)?  Other   Week 3 attempt successful?  Yes   Call start time  1441   Call end time  1444   Person spoke with today (if not patient) and relationship  momBianca   Meds reviewed with patient/caregiver?  Yes   Is the patient taking all medications as directed (includes completed medication regime)?  Yes   Has the patient kept scheduled appointments due by today?  Yes   Comments  Saw Dr. Tamayo today Mother says   Pulse Ox monitoring  None   Comments  HR is going back down, is 102 now, was 136, no diarrhea now, feeling better   What is the patient's perception of their health status since discharge?  Improving   Additional teach back comments  Stopped smoking for now   Week 3 Call Completed?  Yes   Wrap up additional comments  No diarrhea, has stopped smoking for now, is having fast HR befr stopping smoking, now better.           Addie Werner, RN

## 2020-05-26 NOTE — PROGRESS NOTES
The ABCs of the Annual Wellness Visit  Initial Medicare Wellness Visit    Chief Complaint   Patient presents with   • Follow-up     hospital       Subjective   History of Present Illness:  Erickson Singh is a 39 y.o. male who presents for an Initial Medicare Wellness Visit.    HEALTH RISK ASSESSMENT    Recent Hospitalizations:  Recently treated at the following:  [facility]    Current Medical Providers:  Patient Care Team:  Jatin Tamayo MD as PCP - General (Family Medicine)  Emmanuel Jesus MD as Surgeon (General Surgery)    Smoking Status:  Social History     Tobacco Use   Smoking Status Current Every Day Smoker   • Types: Cigarettes   Smokeless Tobacco Never Used       Alcohol Consumption:  Social History     Substance and Sexual Activity   Alcohol Use No       Depression Screen:   No flowsheet data found.    Fall Risk Screen:  STEADI Fall Risk Assessment has not been completed.    Health Habits and Functional and Cognitive Screening:  No flowsheet data found.      Does the patient have evidence of cognitive impairment? Yes    Asprin use counseling:Does not need ASA (and currently is not on it)    Age-appropriate Screening Schedule:  Refer to the list below for future screening recommendations based on patient's age, sex and/or medical conditions. Orders for these recommended tests are listed in the plan section. The patient has been provided with a written plan.    Health Maintenance   Topic Date Due   • PNEUMOCOCCAL VACCINE (19-64 MEDIUM RISK) (1 of 1 - PPSV23) 06/22/2024 (Originally 8/12/1998)   • INFLUENZA VACCINE  08/01/2020   • TDAP/TD VACCINES (2 - Td) 07/14/2024          The following portions of the patient's history were reviewed and updated as appropriate: allergies, current medications, past family history, past medical history, past social history, past surgical history and problem list.    Outpatient Medications Prior to Visit   Medication Sig Dispense Refill   •  "divalproex (DEPAKOTE) 500 MG 24 hr tablet tablet, extended release 24 hr 2 times per day Oral  0   • vitamin C (VITAMIN C) 500 MG tablet Take 1 tablet by mouth Daily. 30 tablet 1   • ziprasidone (GEODON) 80 MG capsule Take 80 mg by mouth 2 (Two) Times a Day.     • acidophilus (FLORANEX) tablet tablet Take 1 tablet by mouth Daily. 15 tablet 1   • sucralfate (CARAFATE) 1 g tablet Take 1 tablet by mouth 4 (Four) Times a Day. X 4wks 120 tablet 1     No facility-administered medications prior to visit.        Patient Active Problem List   Diagnosis   • Tobacco dependence syndrome   • Chronic undifferentiated schizophrenia (CMS/HCC)   • Bipolar disorder (CMS/HCC)   • Dyspepsia       Advanced Care Planning:  ACP discussion was held with the patient during this visit. Not old enough plus mother has power of  and guardianship given schizophrenia    Review of Systems    Compared to one year ago, the patient feels his physical health is the same.  Compared to one year ago, the patient feels his mental health is the same.    Reviewed chart for potential of high risk medication in the elderly: not applicable  Reviewed chart for potential of harmful drug interactions in the elderly:not applicable    Objective         Vitals:    05/26/20 1139   BP: 124/84   BP Location: Left arm   Patient Position: Sitting   Pulse: 101   SpO2: 99%   Weight: 92.4 kg (203 lb 11.2 oz)   Height: 182.9 cm (72\")   PainSc: 0-No pain       Body mass index is 27.63 kg/m².  Discussed the patient's BMI with him. The BMI is in the acceptable range.    Physical Exam          Assessment/Plan   Medicare Risks and Personalized Health Plan  CMS Preventative Services Quick Reference  Inactivity/Sedentary  Obesity/Overweight   Tobacco Use/Dependance (use dotphrase .tobaccocessation for documentation)    The above risks/problems have been discussed with the patient.  Pertinent information has been shared with the patient in the After Visit Summary.  Follow up " plans and orders are seen below in the Assessment/Plan Section.    Diagnoses and all orders for this visit:    1. Diarrhea, unspecified type (Primary)    2. Hypotension due to hypovolemia    3. Tobacco dependence syndrome    4. Chronic undifferentiated schizophrenia (CMS/[facility])    5. Medicare annual wellness visit, initial      Follow Up:  No follow-ups on file.     An After Visit Summary and PPPS were given to the patient.       Erickson Singh  reports that he has been smoking cigarettes. He has never used smokeless tobacco.. I have educated him on the risk of diseases from using tobacco products such as cancer, COPD and heart diease.     I advised him to quit and he is willing to quit. We have discussed the following method/s for tobacco cessation:  Counseling.  Together we have set a quit date for Up to patient.  He will follow up with me in 6 months or sooner to check on his progress.    I spent 5 minutes counseling the patient.

## 2020-06-03 ENCOUNTER — READMISSION MANAGEMENT (OUTPATIENT)
Dept: CALL CENTER | Facility: HOSPITAL | Age: 40
End: 2020-06-03

## 2020-06-03 NOTE — OUTREACH NOTE
Medical Week 4 Survey      Responses   [facility] patient discharged from?  [place]   COVID-19 Test Status  Negative   Does the patient have one of the following disease processes/diagnoses(primary or secondary)?  Other   Week 4 attempt successful?  Yes   Call start time  1430   Call end time  1433   Discharge diagnosis  hyoptension d/t diarrhea, bacteremia   Is patient permission given to speak with other caregiver?  Yes   List who call center can speak with  Biacna berkowitz   Person spoke with today (if not patient) and relationship  Bianca berkowitz   Meds reviewed with patient/caregiver?  Yes   Is the patient having any side effects they believe may be caused by any medication additions or changes?  No   Is the patient taking all medications as directed (includes completed medication regime)?  Yes   Has the patient kept scheduled appointments due by today?  Yes   Comments  saw Dr. Tamayo   Is the patient still receiving Home Health Services?  N/A   Pulse Ox monitoring  None   Psychosocial issues?  No   What is the patient's perception of their health status since discharge?  Returned to baseline/stable   Is the patient/caregiver able to teach back signs and symptoms related to disease process for when to call PCP?  Yes   Is the patient/caregiver able to teach back signs and symptoms related to disease process for when to call 911?  Yes   Is the patient/caregiver able to teach back the hierarchy of who to call/visit for symptoms/problems? PCP, Specialist, Home health nurse, Urgent Care, ED, 911  Yes   Week 4 Call Completed?  Yes   Would the patient like one additional call?  No   Graduated  Yes   Did the patient feel the follow up calls were helpful during their recovery period?  Yes   Wrap up additional comments  States no diarrhea and HR is now running 98 or so since he stopped smoking.           Anisha Palumbo RN

## 2020-08-20 ENCOUNTER — OFFICE VISIT (OUTPATIENT)
Dept: FAMILY MEDICINE CLINIC | Facility: CLINIC | Age: 40
End: 2020-08-20

## 2020-08-20 VITALS
DIASTOLIC BLOOD PRESSURE: 82 MMHG | SYSTOLIC BLOOD PRESSURE: 126 MMHG | BODY MASS INDEX: 28.21 KG/M2 | HEIGHT: 72 IN | WEIGHT: 208.3 LBS

## 2020-08-20 DIAGNOSIS — F31.9 BIPOLAR AFFECTIVE DISORDER, REMISSION STATUS UNSPECIFIED (HCC): Chronic | ICD-10-CM

## 2020-08-20 DIAGNOSIS — R15.9 INCONTINENCE OF FECES, UNSPECIFIED FECAL INCONTINENCE TYPE: ICD-10-CM

## 2020-08-20 DIAGNOSIS — R19.7 DIARRHEA, UNSPECIFIED TYPE: Primary | ICD-10-CM

## 2020-08-20 PROCEDURE — 99213 OFFICE O/P EST LOW 20 MIN: CPT | Performed by: FAMILY MEDICINE

## 2020-08-20 RX ORDER — ESCITALOPRAM OXALATE 10 MG/1
10 TABLET ORAL DAILY
COMMUNITY

## 2020-08-20 NOTE — PROGRESS NOTES
Subjective   Erickson Singh is a 40 y.o. male.  Requesting evaluation of recurrent diarrhea and relatively new onset fecal incontinence.  Mother states that sometimes will have soilage slightly in the pants and on the car seat at the end of the day.  Patient states has had intermittent diarrhea off and on for several weeks.  No blood no fever no chills no pain.  Symptoms most consistent what sounds like fecal incontinence with persistent diarrhea.  Was hospitalized 3 months ago for same with dehydration seem to improve but now has returned slowly.  Has remained off of cigarettes since last visit medicines have remained the same for his bipolar disorder with schizophrenia.  History noted.  Tachycardia is also improved.    History of Present Illness   HPI    The following portions of the patient's history were reviewed and updated as appropriate: allergies, current medications, past family history, past medical history, past social history, past surgical history and problem list.    Review of Systems  Review of Systems   Constitutional: Negative for activity change, appetite change, fatigue and unexpected weight change.   HENT: Negative for trouble swallowing and voice change.    Eyes: Negative for redness and visual disturbance.   Respiratory: Negative for cough and wheezing.    Cardiovascular: Negative for chest pain and palpitations.   Gastrointestinal: Positive for diarrhea. Negative for abdominal pain, constipation, nausea and vomiting.   Genitourinary: Negative for urgency.   Musculoskeletal: Negative for joint swelling.   Neurological: Negative for syncope and headaches.   Hematological: Negative for adenopathy.   Psychiatric/Behavioral: Negative for sleep disturbance.       Objective   Physical Exam  Physical Exam   Constitutional: He is oriented to person, place, and time. He appears well-developed.   HENT:   Head: Normocephalic.   Eyes: Pupils are equal, round, and reactive to light.   Neck:  "Normal range of motion. No thyromegaly present.   Cardiovascular: Normal rate, regular rhythm, normal heart sounds and intact distal pulses. Exam reveals no gallop and no friction rub.   No murmur heard.  Pulmonary/Chest: Breath sounds normal.   Abdominal: Soft. He exhibits no distension and no mass. There is no tenderness.   Musculoskeletal: Normal range of motion.   Neurological: He is alert and oriented to person, place, and time. He has normal reflexes.   Skin: Skin is warm and dry.   Psychiatric: His affect is blunt. His speech is delayed. He is slowed.   More even affect more conversive.         Visit Vitals  /82   Ht 182.9 cm (72\")   Wt 94.5 kg (208 lb 4.8 oz)   BMI 28.25 kg/m²     Body mass index is 28.25 kg/m².      Assessment/Plan   Erickson was seen today for diarrhea.    Diagnoses and all orders for this visit:    Diarrhea, unspecified type  -     Ambulatory Referral to Gastroenterology    Incontinence of feces, unspecified fecal incontinence type  -     Ambulatory Referral to Gastroenterology    Bipolar affective disorder, remission status unspecified (CMS/[facility])  -     Ambulatory Referral to Gastroenterology    Suspect multiple factors including possible side effect of medication functional problems although cannot rule out structural and/or physiologic.  Since patient has gained weight we do not proceed immediately but have counseled need for GI evaluation of lower tract.  Have counseled increasing hydration and encouraged to stay off of cigarettes and stay on regular medicines for now.  Keep regular appointment.  "

## 2020-08-27 ENCOUNTER — OFFICE VISIT (OUTPATIENT)
Dept: GASTROENTEROLOGY | Facility: CLINIC | Age: 40
End: 2020-08-27

## 2020-08-27 VITALS
WEIGHT: 207 LBS | DIASTOLIC BLOOD PRESSURE: 67 MMHG | BODY MASS INDEX: 27.43 KG/M2 | HEART RATE: 100 BPM | SYSTOLIC BLOOD PRESSURE: 100 MMHG | HEIGHT: 73 IN

## 2020-08-27 DIAGNOSIS — K62.5 HEMORRHAGE OF ANUS AND RECTUM: ICD-10-CM

## 2020-08-27 DIAGNOSIS — R11.0 NAUSEA: ICD-10-CM

## 2020-08-27 DIAGNOSIS — R10.84 GENERALIZED ABDOMINAL PAIN: Primary | ICD-10-CM

## 2020-08-27 DIAGNOSIS — R19.7 DIARRHEA, UNSPECIFIED TYPE: ICD-10-CM

## 2020-08-27 PROCEDURE — 99204 OFFICE O/P NEW MOD 45 MIN: CPT | Performed by: INTERNAL MEDICINE

## 2020-08-27 RX ORDER — DEXTROSE AND SODIUM CHLORIDE 5; .45 G/100ML; G/100ML
30 INJECTION, SOLUTION INTRAVENOUS CONTINUOUS PRN
Status: CANCELLED | OUTPATIENT
Start: 2020-09-14

## 2020-08-27 RX ORDER — OMEPRAZOLE 40 MG/1
40 CAPSULE, DELAYED RELEASE ORAL DAILY
Qty: 30 CAPSULE | Refills: 11 | Status: SHIPPED | OUTPATIENT
Start: 2020-08-27 | End: 2021-06-16

## 2020-08-27 RX ORDER — DICYCLOMINE HCL 20 MG
20 TABLET ORAL EVERY 6 HOURS
Qty: 90 TABLET | Refills: 5 | Status: SHIPPED | OUTPATIENT
Start: 2020-08-27 | End: 2020-09-10

## 2020-08-27 NOTE — PATIENT INSTRUCTIONS

## 2020-08-27 NOTE — PROGRESS NOTES
[facility]      Chief Complaint:   Chief Complaint   Patient presents with   • Fecal Incontinence   • Diarrhea   • Anal Itching   • Black or Bloody Stool   • Vomiting       Subjective     HPI:   Mr. Singh is a 40-year-old -American male with past medical history of schizophrenia, tachycardia, bipolar disorder, tobacco dependence syndrome, vitamin deficiency, anal fistula repair presenting for evaluation for change in bowel habits and abdominal pain.  He has intermittent bouts of generalized abdominal discomfort for past year associated with diarrhea, rectal bleeding and fecal incontinence.  He also has intermittent bouts of nausea and vomiting.  Denied GERD symptoms, dysphagia or weight loss.  Also denied NSAID usage.  He has history of anal fistulectomy 5 to 6 years ago.  Family history is unremarkable for inflammatory bowel disease and colorectal neoplasia.    Past Medical History:   Past Medical History:   Diagnosis Date   • Anorectal fistula     fistula in ano   • Bipolar disorder (CMS/HCC)    • Chronic undifferentiated schizophrenia (CMS/HCC)    • Dental abscess    • Dental caries    • External hordeolum    • Facial swelling    • On long term drug therapy    • Rectal discharge    • Tachycardia    • Tobacco dependence syndrome    • Vitamin deficiency        Past Surgical History:  Past Surgical History:   Procedure Laterality Date   • ANAL FISTULA REPAIR  03/07/2014    Anal fistula, left side, 5 cm from the anus. A left sided fistula-in-ano with connection to the posterior midline of the anus.   • INJECTION OF MEDICATION  02/05/2013    Rocephin (1)      • INJECTION OF MEDICATION  02/05/2013    Toradol (1)      • TONSILLECTOMY AND ADENOIDECTOMY         Family History:  Family History   Problem Relation Age of Onset   • Cancer Other    • Diabetes Other    • Hypertension Other    • Stroke Other    • Thyroid disease Other        Social History:   reports that he has been smoking  cigarettes. He has never used smokeless tobacco. He reports that he does not drink alcohol or use drugs.    Medications:   Prior to Admission medications    Medication Sig Start Date End Date Taking? Authorizing Provider   divalproex (DEPAKOTE) 500 MG 24 hr tablet tablet, extended release 24 hr 2 times per day Oral 10/29/16  Yes Josué Soto MD   escitalopram (LEXAPRO) 10 MG tablet Take 10 mg by mouth Daily.   Yes Josué Soto MD   ziprasidone (GEODON) 80 MG capsule Take 80 mg by mouth 2 (Two) Times a Day.   Yes Josué Soto MD   dicyclomine (BENTYL) 20 MG tablet Take 1 tablet by mouth Every 6 (Six) Hours for 30 days. 8/27/20 9/26/20  Kati Lin MD   omeprazole (PrilOSEC) 40 MG capsule Take 1 capsule by mouth Daily. 8/27/20   Kati Lin MD       Allergies:  Patient has no known allergies.    ROS:    Review of Systems   Constitutional: Negative for chills, fatigue, fever and unexpected weight change.   HENT: Negative for congestion, ear discharge, hearing loss, nosebleeds and sore throat.    Eyes: Negative for pain, discharge and redness.   Respiratory: Negative for cough, chest tightness, shortness of breath and wheezing.    Cardiovascular: Negative for chest pain and palpitations.   Gastrointestinal: Positive for abdominal pain, anal bleeding, diarrhea, nausea and vomiting. Negative for abdominal distention, blood in stool and constipation.   Endocrine: Negative for cold intolerance, polydipsia, polyphagia and polyuria.   Genitourinary: Negative for dysuria, flank pain, frequency, hematuria and urgency.   Musculoskeletal: Negative for arthralgias, back pain, joint swelling and myalgias.   Skin: Negative for color change, pallor and rash.   Neurological: Negative for tremors, seizures, syncope, weakness and headaches.   Hematological: Negative for adenopathy. Does not bruise/bleed easily.   Psychiatric/Behavioral: Negative for behavioral problems, confusion, dysphoric mood,  "hallucinations and suicidal ideas. The patient is not nervous/anxious.      Objective     Blood pressure 100/67, pulse 100, height 185.4 cm (73\"), weight 93.9 kg (207 lb).    Physical Exam   Constitutional: He is oriented to person, place, and time. He appears well-developed and well-nourished.   HENT:   Head: Normocephalic and atraumatic.   Mouth/Throat: Oropharynx is clear and moist.   Eyes: Pupils are equal, round, and reactive to light. Conjunctivae and EOM are normal.   Neck: Normal range of motion. Neck supple. No thyromegaly present.   Cardiovascular: Normal rate, regular rhythm and normal heart sounds.   No murmur heard.  Pulmonary/Chest: Effort normal and breath sounds normal. He has no wheezes.   Abdominal: Soft. Bowel sounds are normal. He exhibits no distension and no mass. There is no tenderness. No hernia.   Genitourinary:   Genitourinary Comments: No lesions noted   Musculoskeletal: Normal range of motion. He exhibits no edema or tenderness.   Lymphadenopathy:     He has no cervical adenopathy.   Neurological: He is alert and oriented to person, place, and time. No cranial nerve deficit.   Skin: Skin is warm and dry. No rash noted.   Psychiatric: He has a normal mood and affect. Thought content normal.      Extremities: No edema, cyanosis or clubbing.    Assessment/Plan    1.  Abdominal pain with diarrhea, rectal bleeding and history of anal fistulectomy rule out inflammatory bowel disease and colorectal neoplasia.  Add Bentyl 20 mg p.o. 4 times daily.  Add high-fiber diet.  Proceed with colonoscopy for further evaluation.  2.  Abdominal pain with nausea and vomiting rule out peptic ulcer disease, gastritis and pancreaticobiliary pathology.  Add Prilosec 40 mg p.o. daily.  Proceed with EGD for further evaluation.  Add Zofran as needed.  May need pancreaticobiliary evaluation if EGD is unremarkable.  3.  Fecal incontinence rule out fistula and IBD.  Add high-fiber diet and Bentyl.  4.  Tobacco usage, " recommend cessation.  5.High BMI, recommend exercise and diet control.  Erickson was seen today for fecal incontinence, diarrhea, anal itching, black or bloody stool and vomiting.    Diagnoses and all orders for this visit:    Generalized abdominal pain  -     Case Request; Standing  -     Case Request    Diarrhea, unspecified type  -     Case Request; Standing  -     Case Request    Hemorrhage of anus and rectum  -     Case Request; Standing  -     Case Request    Nausea  -     Case Request; Standing  -     Case Request    Other orders  -     Follow Anesthesia Guidelines / Standing Orders; Future  -     Obtain Informed Consent; Future  -     omeprazole (PrilOSEC) 40 MG capsule; Take 1 capsule by mouth Daily.  -     dicyclomine (BENTYL) 20 MG tablet; Take 1 tablet by mouth Every 6 (Six) Hours for 30 days.        ESOPHAGOGASTRODUODENOSCOPY (N/A), COLONOSCOPY (N/A)     Diagnosis Plan   1. Generalized abdominal pain  Case Request    dextrose 5 % and sodium chloride 0.45 % infusion    Case Request   2. Diarrhea, unspecified type  Case Request    dextrose 5 % and sodium chloride 0.45 % infusion    Case Request   3. Hemorrhage of anus and rectum  Case Request    dextrose 5 % and sodium chloride 0.45 % infusion    Case Request   4. Nausea  Case Request    dextrose 5 % and sodium chloride 0.45 % infusion    Case Request       Anticipated Surgical Procedure:  Orders Placed This Encounter   Procedures   • Follow Anesthesia Guidelines / Standing Orders     Standing Status:   Future   • Obtain Informed Consent     Standing Status:   Future     Order Specific Question:   Informed Consent Given For     Answer:   egd and colonoscopy       The risks, benefits, and alternatives of this procedure have been discussed with the patient or the responsible party- the patient understands and agrees to proceed.            This document has been electronically signed by Kati Lin MD on August 27, 2020 16:23

## 2020-09-11 ENCOUNTER — LAB (OUTPATIENT)
Dept: LAB | Facility: HOSPITAL | Age: 40
End: 2020-09-11

## 2020-09-11 PROCEDURE — C9803 HOPD COVID-19 SPEC COLLECT: HCPCS

## 2020-09-11 PROCEDURE — U0003 INFECTIOUS AGENT DETECTION BY NUCLEIC ACID (DNA OR RNA); SEVERE ACUTE RESPIRATORY SYNDROME CORONAVIRUS 2 (SARS-COV-2) (CORONAVIRUS DISEASE [COVID-19]), AMPLIFIED PROBE TECHNIQUE, MAKING USE OF HIGH THROUGHPUT TECHNOLOGIES AS DESCRIBED BY CMS-2020-01-R: HCPCS | Performed by: INTERNAL MEDICINE

## 2020-09-11 PROCEDURE — C9803 HOPD COVID-19 SPEC COLLECT: HCPCS | Performed by: INTERNAL MEDICINE

## 2020-09-12 LAB
COVID LABCORP PRIORITY: NORMAL
SARS-COV-2 RNA RESP QL NAA+PROBE: NOT DETECTED

## 2020-09-14 ENCOUNTER — ANESTHESIA EVENT (OUTPATIENT)
Dept: GASTROENTEROLOGY | Facility: HOSPITAL | Age: 40
End: 2020-09-14

## 2020-09-14 ENCOUNTER — ANESTHESIA (OUTPATIENT)
Dept: GASTROENTEROLOGY | Facility: HOSPITAL | Age: 40
End: 2020-09-14

## 2020-09-14 ENCOUNTER — HOSPITAL ENCOUNTER (OUTPATIENT)
Facility: HOSPITAL | Age: 40
Setting detail: HOSPITAL OUTPATIENT SURGERY
Discharge: HOME | End: 2020-09-14
Attending: INTERNAL MEDICINE | Admitting: INTERNAL MEDICINE

## 2020-09-14 VITALS
WEIGHT: 205.25 LBS | TEMPERATURE: 97.1 F | SYSTOLIC BLOOD PRESSURE: 115 MMHG | OXYGEN SATURATION: 98 % | BODY MASS INDEX: 27.8 KG/M2 | DIASTOLIC BLOOD PRESSURE: 71 MMHG | RESPIRATION RATE: 20 BRPM | HEART RATE: 100 BPM | HEIGHT: 72 IN

## 2020-09-14 DIAGNOSIS — R19.7 DIARRHEA, UNSPECIFIED TYPE: ICD-10-CM

## 2020-09-14 DIAGNOSIS — K62.5 HEMORRHAGE OF ANUS AND RECTUM: ICD-10-CM

## 2020-09-14 DIAGNOSIS — R10.84 GENERALIZED ABDOMINAL PAIN: ICD-10-CM

## 2020-09-14 DIAGNOSIS — R11.0 NAUSEA: ICD-10-CM

## 2020-09-14 PROCEDURE — 45380 COLONOSCOPY AND BIOPSY: CPT | Performed by: INTERNAL MEDICINE

## 2020-09-14 PROCEDURE — 25010000002 PROPOFOL 10 MG/ML EMULSION: Performed by: NURSE ANESTHETIST, CERTIFIED REGISTERED

## 2020-09-14 PROCEDURE — 43239 EGD BIOPSY SINGLE/MULTIPLE: CPT | Performed by: INTERNAL MEDICINE

## 2020-09-14 PROCEDURE — 88305 TISSUE EXAM BY PATHOLOGIST: CPT

## 2020-09-14 RX ORDER — KETAMINE HYDROCHLORIDE 100 MG/ML
INJECTION INTRAMUSCULAR; INTRAVENOUS AS NEEDED
Status: DISCONTINUED | OUTPATIENT
Start: 2020-09-14 | End: 2020-09-14 | Stop reason: SURG

## 2020-09-14 RX ORDER — PROPOFOL 10 MG/ML
VIAL (ML) INTRAVENOUS AS NEEDED
Status: DISCONTINUED | OUTPATIENT
Start: 2020-09-14 | End: 2020-09-14 | Stop reason: SURG

## 2020-09-14 RX ORDER — LIDOCAINE HYDROCHLORIDE 20 MG/ML
INJECTION, SOLUTION INTRAVENOUS AS NEEDED
Status: DISCONTINUED | OUTPATIENT
Start: 2020-09-14 | End: 2020-09-14 | Stop reason: SURG

## 2020-09-14 RX ORDER — ONDANSETRON 2 MG/ML
4 INJECTION INTRAMUSCULAR; INTRAVENOUS ONCE AS NEEDED
Status: DISCONTINUED | OUTPATIENT
Start: 2020-09-14 | End: 2020-09-14 | Stop reason: HOSPADM

## 2020-09-14 RX ORDER — DEXTROSE AND SODIUM CHLORIDE 5; .45 G/100ML; G/100ML
30 INJECTION, SOLUTION INTRAVENOUS CONTINUOUS PRN
Status: DISCONTINUED | OUTPATIENT
Start: 2020-09-14 | End: 2020-09-14 | Stop reason: HOSPADM

## 2020-09-14 RX ADMIN — PROPOFOL 20 MG: 10 INJECTION, EMULSION INTRAVENOUS at 11:33

## 2020-09-14 RX ADMIN — PROPOFOL 20 MG: 10 INJECTION, EMULSION INTRAVENOUS at 11:31

## 2020-09-14 RX ADMIN — PROPOFOL 20 MG: 10 INJECTION, EMULSION INTRAVENOUS at 11:35

## 2020-09-14 RX ADMIN — PROPOFOL 80 MG: 10 INJECTION, EMULSION INTRAVENOUS at 11:21

## 2020-09-14 RX ADMIN — PROPOFOL 20 MG: 10 INJECTION, EMULSION INTRAVENOUS at 11:23

## 2020-09-14 RX ADMIN — PROPOFOL 20 MG: 10 INJECTION, EMULSION INTRAVENOUS at 11:25

## 2020-09-14 RX ADMIN — DEXTROSE AND SODIUM CHLORIDE 30 ML/HR: 5; 450 INJECTION, SOLUTION INTRAVENOUS at 10:31

## 2020-09-14 RX ADMIN — PROPOFOL 20 MG: 10 INJECTION, EMULSION INTRAVENOUS at 11:29

## 2020-09-14 RX ADMIN — PROPOFOL 20 MG: 10 INJECTION, EMULSION INTRAVENOUS at 11:37

## 2020-09-14 RX ADMIN — PROPOFOL 20 MG: 10 INJECTION, EMULSION INTRAVENOUS at 11:27

## 2020-09-14 RX ADMIN — LIDOCAINE HYDROCHLORIDE 60 MG: 20 INJECTION, SOLUTION INTRAVENOUS at 11:21

## 2020-09-14 RX ADMIN — KETAMINE HYDROCHLORIDE 20 MG: 100 INJECTION INTRAMUSCULAR; INTRAVENOUS at 11:21

## 2020-09-14 NOTE — ANESTHESIA PREPROCEDURE EVALUATION
Anesthesia Evaluation     Patient summary reviewed   NPO Solid Status: > 8 hours  NPO Liquid Status: > 2 hours           Airway   Mallampati: II  TM distance: >3 FB  Neck ROM: full  No difficulty expected  Dental    (+) upper dentures    Pulmonary - normal exam   (+) a smoker Current Smoked day of surgery,   Cardiovascular - normal exam    ECG reviewed    (+) valvular problems/murmurs murmur,     ROS comment: Vent. Rate : 097 BPM     Atrial Rate : 097 BPM     P-R Int : 166 ms          QRS Dur : 086 ms      QT Int : 362 ms       P-R-T Axes : 060 028 033 degrees     QTc Int : 459 ms     Normal sinus rhythm  Normal ECG  When compared with ECG of 03-MAY-2016 14:54,  No significant change was found  Confirmed by LEÓN MEEKS MD ([id number]) on 5/4/2020 8:07:44 AM    Neuro/Psych  (+) psychiatric history Bipolar and Schizophrenia,     GI/Hepatic/Renal/Endo    (+)  GERD,      Musculoskeletal (-) negative ROS    Abdominal    Substance History      OB/GYN          Other                        Anesthesia Plan    ASA 2     MAC     intravenous induction     Anesthetic plan, all risks, benefits, and alternatives have been provided, discussed and informed consent has been obtained with: patient.

## 2020-09-14 NOTE — ANESTHESIA POSTPROCEDURE EVALUATION
Patient: Erickson Singh    Procedure Summary     Date: 09/14/20 Room / Location: [facility] ENDOSCOPY 4 / [facility] ENDOSCOPY    Anesthesia Start: 1119 Anesthesia Stop: 1146    Procedures:       ESOPHAGOGASTRODUODENOSCOPY (N/A )      COLONOSCOPY (N/A ) Diagnosis:       Generalized abdominal pain      Diarrhea, unspecified type      Hemorrhage of anus and rectum      Nausea      (Generalized abdominal pain [R10.84])      (Diarrhea, unspecified type [R19.7])      (Hemorrhage of anus and rectum [K62.5])      (Nausea [R11.0])    Surgeon: Kati Lin MD Provider: Santy Nicole CRNA    Anesthesia Type: MAC ASA Status: 2          Anesthesia Type: MAC    Vitals  No vitals data found for the desired time range.          Post Anesthesia Care and Evaluation    Patient location during evaluation: PACU  Level of consciousness: sleepy but conscious  Pain score: 0  Pain management: adequate  Airway patency: patent  Anesthetic complications: No anesthetic complications  PONV Status: none  Cardiovascular status: acceptable and hemodynamically stable  Respiratory status: acceptable and spontaneous ventilation  Hydration status: acceptable

## 2020-09-15 LAB
LAB AP CASE REPORT: NORMAL
PATH REPORT.FINAL DX SPEC: NORMAL

## 2021-01-04 PROCEDURE — U0003 INFECTIOUS AGENT DETECTION BY NUCLEIC ACID (DNA OR RNA); SEVERE ACUTE RESPIRATORY SYNDROME CORONAVIRUS 2 (SARS-COV-2) (CORONAVIRUS DISEASE [COVID-19]), AMPLIFIED PROBE TECHNIQUE, MAKING USE OF HIGH THROUGHPUT TECHNOLOGIES AS DESCRIBED BY CMS-2020-01-R: HCPCS | Performed by: NURSE PRACTITIONER

## 2021-03-05 ENCOUNTER — OFFICE VISIT (OUTPATIENT)
Dept: FAMILY MEDICINE CLINIC | Facility: CLINIC | Age: 41
End: 2021-03-05

## 2021-03-05 VITALS
BODY MASS INDEX: 29.26 KG/M2 | DIASTOLIC BLOOD PRESSURE: 70 MMHG | WEIGHT: 216 LBS | HEIGHT: 72 IN | SYSTOLIC BLOOD PRESSURE: 118 MMHG

## 2021-03-05 DIAGNOSIS — F20.9 SCHIZOPHRENIA, UNSPECIFIED TYPE (HCC): ICD-10-CM

## 2021-03-05 DIAGNOSIS — F17.200 SMOKER: Primary | ICD-10-CM

## 2021-03-05 PROBLEM — R10.84 GENERALIZED ABDOMINAL PAIN: Status: RESOLVED | Noted: 2020-08-27 | Resolved: 2021-03-05

## 2021-03-05 PROBLEM — K62.5 HEMORRHAGE OF ANUS AND RECTUM: Status: RESOLVED | Noted: 2020-08-27 | Resolved: 2021-03-05

## 2021-03-05 PROBLEM — R11.0 NAUSEA: Status: RESOLVED | Noted: 2020-08-27 | Resolved: 2021-03-05

## 2021-03-05 PROBLEM — R19.7 DIARRHEA: Status: RESOLVED | Noted: 2020-08-27 | Resolved: 2021-03-05

## 2021-03-05 PROCEDURE — 99213 OFFICE O/P EST LOW 20 MIN: CPT | Performed by: FAMILY MEDICINE

## 2021-03-05 RX ORDER — ZIPRASIDONE HYDROCHLORIDE 80 MG/1
80 CAPSULE ORAL 2 TIMES DAILY WITH MEALS
COMMUNITY

## 2021-03-05 RX ORDER — NICOTINE 21 MG/24HR
1 PATCH, TRANSDERMAL 24 HOURS TRANSDERMAL EVERY 24 HOURS
Qty: 28 EACH | Refills: 1 | Status: SHIPPED | OUTPATIENT
Start: 2021-03-05 | End: 2021-08-10

## 2021-03-05 RX ORDER — ZIPRASIDONE HYDROCHLORIDE 20 MG/1
20 CAPSULE ORAL 2 TIMES DAILY WITH MEALS
COMMUNITY

## 2021-03-05 NOTE — PROGRESS NOTES
"Subjective   Erickson Singh is a 40 y.o. male.  Requesting help stopping smoking stay stop smoking started back smoking.  Continues on medicines for schizophrenia.  Has tried patches in the past with marginal effect.  Is not a candidate for oral therapy.    History of Present Illness   HPI    The following portions of the patient's history were reviewed and updated as appropriate: allergies, current medications, past family history, past medical history, past social history, past surgical history and problem list.    Review of Systems  Review of Systems   Constitutional: Negative for activity change, appetite change, fatigue and unexpected weight change.   HENT: Negative for trouble swallowing and voice change.    Eyes: Negative for redness and visual disturbance.   Respiratory: Negative for cough and wheezing.    Cardiovascular: Negative for chest pain and palpitations.   Gastrointestinal: Negative for abdominal pain, constipation, diarrhea, nausea and vomiting.   Genitourinary: Negative for urgency.   Musculoskeletal: Negative for joint swelling.   Neurological: Negative for syncope and headaches.   Hematological: Negative for adenopathy.   Psychiatric/Behavioral: Negative for sleep disturbance.       Objective   Physical Exam  Physical Exam  Constitutional:       Appearance: Normal appearance. He is normal weight.   Neurological:      Mental Status: He is alert.   Psychiatric:         Mood and Affect: Affect is blunt.         Speech: Speech is delayed.         Behavior: Behavior is slowed.           Visit Vitals  /70   Ht 182.9 cm (72\")   Wt 98 kg (216 lb)   BMI 29.29 kg/m²     Body mass index is 29.29 kg/m².      Assessment/Plan   Diagnoses and all orders for this visit:    1. Smoker (Primary)  -     nicotine (Nicoderm CQ) 14 MG/24HR patch; Place 1 patch on the skin as directed by provider Daily.  Dispense: 28 each; Refill: 1  -     nicotine (Nicoderm CQ) 21 MG/24HR patch; Place 1 patch on the " skin as directed by provider Daily.  Dispense: 28 each; Refill: 1  -     nicotine (Nicoderm CQ) 7 MG/24HR patch; Place 1 patch on the skin as directed by provider Daily.  Dispense: 28 patch; Refill: 1    2. Schizophrenia, unspecified type (CMS/[facility])     on stopping smoking.  3-10m     Have counseled mainly on using the patches along with behavioral modification.  Counseled pros and cons limitations of same.  Counseled on retrying if failed breakthrough.  Rechecks directed

## 2021-03-08 ENCOUNTER — TELEPHONE (OUTPATIENT)
Dept: FAMILY MEDICINE CLINIC | Facility: CLINIC | Age: 41
End: 2021-03-08

## 2021-03-08 NOTE — TELEPHONE ENCOUNTER
Pt mother called to say insurance denied the nicotine patch you prescribed. Can anything be done to convince them to cover this?     nicotine (Nicoderm CQ) and requests next steps.    [phone]    Using [employer] [facility] [place]

## 2021-08-03 ENCOUNTER — OFFICE VISIT (OUTPATIENT)
Dept: FAMILY MEDICINE CLINIC | Facility: CLINIC | Age: 41
End: 2021-08-03

## 2021-08-03 VITALS
SYSTOLIC BLOOD PRESSURE: 110 MMHG | HEIGHT: 73 IN | DIASTOLIC BLOOD PRESSURE: 68 MMHG | BODY MASS INDEX: 27.04 KG/M2 | WEIGHT: 204 LBS

## 2021-08-03 DIAGNOSIS — K61.1 PERIRECTAL ABSCESS: ICD-10-CM

## 2021-08-03 DIAGNOSIS — K60.4 RECTAL FISTULA: ICD-10-CM

## 2021-08-03 DIAGNOSIS — K62.89 RECTAL PAIN: Primary | ICD-10-CM

## 2021-08-03 PROCEDURE — 99213 OFFICE O/P EST LOW 20 MIN: CPT | Performed by: FAMILY MEDICINE

## 2021-08-03 RX ORDER — CLINDAMYCIN HYDROCHLORIDE 300 MG/1
300 CAPSULE ORAL 3 TIMES DAILY
Qty: 30 CAPSULE | Refills: 0 | Status: SHIPPED | OUTPATIENT
Start: 2021-08-03 | End: 2021-08-10

## 2021-08-03 NOTE — PROGRESS NOTES
"Subjective   Erickson Singh is a 40 y.o. male.  Requesting evaluation rectal pain and drainage past 3 to 4 days.  Had it in the past but not recently.  Is having to use adult diapers to catch drainage.  No fever no chills.    History of Present Illness   HPI    The following portions of the patient's history were reviewed and updated as appropriate: allergies, current medications, past family history, past medical history, past social history, past surgical history and problem list.    Review of Systems  Review of Systems   Constitutional: Negative for activity change, appetite change, fatigue and unexpected weight change.   HENT: Negative for trouble swallowing and voice change.    Eyes: Negative for redness and visual disturbance.   Respiratory: Negative for cough and wheezing.    Cardiovascular: Negative for chest pain and palpitations.   Gastrointestinal: Positive for anal bleeding, blood in stool and rectal pain. Negative for abdominal pain, constipation, diarrhea, nausea and vomiting.   Genitourinary: Negative for urgency.   Musculoskeletal: Negative for joint swelling.   Neurological: Negative for syncope and headaches.   Hematological: Negative for adenopathy.   Psychiatric/Behavioral: Negative for sleep disturbance.       Objective   Physical Exam  Physical Exam  Cardiovascular:      Rate and Rhythm: Normal rate.   Abdominal:      Palpations: Abdomen is soft.      Comments: Rectal drainage fistula appearing on the rt at about 9:00.  Multiple hair follicles inflamed and infected around it tender indurated firm gluteal fold right and left.  Able to sit comfortably.   Musculoskeletal:      Cervical back: Normal range of motion.   Psychiatric:         Attention and Perception: Attention normal.         Mood and Affect: Mood normal.         Speech: Speech normal.         Behavior: Behavior normal.           Visit Vitals  /68   Ht 185.4 cm (73\")   Wt 92.5 kg (204 lb)   BMI 26.91 kg/m²     Body " "mass index is 26.91 kg/m².    /68   Ht 185.4 cm (73\")   Wt 92.5 kg (204 lb)   BMI 26.91 kg/m²     Assessment/Plan   Diagnoses and all orders for this visit:    1. Rectal pain (Primary)  -     clindamycin (CLEOCIN) 300 MG capsule; Take 1 capsule by mouth 3 (Three) Times a Day. Till gone  Dispense: 30 capsule; Refill: 0  -     Ambulatory Referral to General Surgery    2. Perirectal abscess  -     clindamycin (CLEOCIN) 300 MG capsule; Take 1 capsule by mouth 3 (Three) Times a Day. Till gone  Dispense: 30 capsule; Refill: 0  -     Ambulatory Referral to General Surgery    3. Rectal fistula  -     clindamycin (CLEOCIN) 300 MG capsule; Take 1 capsule by mouth 3 (Three) Times a Day. Till gone  Dispense: 30 capsule; Refill: 0  -     Ambulatory Referral to General Surgery    Going to need general surgery consultation.  Acutely counseled on warm sitz baths 2-3 times a day squeeze it dry area keeping clean.  Continue using adult pull-ups.  Start antibiotic.  Tetanus updated in 14.  Orders as above rechecks directed  "

## 2021-08-04 ENCOUNTER — TELEPHONE (OUTPATIENT)
Dept: FAMILY MEDICINE CLINIC | Facility: CLINIC | Age: 41
End: 2021-08-04

## 2021-08-06 ENCOUNTER — OFFICE VISIT (OUTPATIENT)
Dept: SURGERY | Facility: CLINIC | Age: 41
End: 2021-08-06

## 2021-08-06 VITALS
BODY MASS INDEX: 27.41 KG/M2 | SYSTOLIC BLOOD PRESSURE: 118 MMHG | DIASTOLIC BLOOD PRESSURE: 72 MMHG | TEMPERATURE: 97 F | WEIGHT: 206.8 LBS | HEART RATE: 93 BPM | HEIGHT: 73 IN

## 2021-08-06 DIAGNOSIS — K60.3 FISTULA-IN-ANO: Primary | ICD-10-CM

## 2021-08-06 PROCEDURE — 99203 OFFICE O/P NEW LOW 30 MIN: CPT | Performed by: SURGERY

## 2021-08-06 RX ORDER — METRONIDAZOLE 500 MG/1
500 TABLET ORAL 3 TIMES DAILY
Qty: 21 TABLET | Refills: 0 | Status: SHIPPED | OUTPATIENT
Start: 2021-08-06 | End: 2021-08-13

## 2021-08-06 RX ORDER — LEVOFLOXACIN 500 MG/1
500 TABLET, FILM COATED ORAL DAILY
Qty: 7 TABLET | Refills: 0 | Status: SHIPPED | OUTPATIENT
Start: 2021-08-06 | End: 2021-08-13

## 2021-08-06 NOTE — PROGRESS NOTES
Chief Complaint   Patient presents with   • Abscess     Possible Perirectal abscess        HPI  40-year-old man seen for a possible left-sided perianal abscess.  He has had decreased induration since beginning clindamycin a few days ago.  He has had no fever or chills but he has had moderate drainage from the perianal area.  Past Medical History:   Diagnosis Date   • Anorectal fistula     fistula in ano   • Bipolar disorder (CMS/HCC)    • Chronic undifferentiated schizophrenia (CMS/HCC)    • Dental abscess    • Dental caries    • External hordeolum    • Facial swelling    • On long term drug therapy    • Rectal discharge    • Tachycardia    • Tobacco dependence syndrome    • Vitamin deficiency        Past Surgical History:   Procedure Laterality Date   • ANAL FISTULA REPAIR  03/07/2014    Anal fistula, left side, 5 cm from the anus. A left sided fistula-in-ano with connection to the posterior midline of the anus.   • COLONOSCOPY N/A 9/14/2020    Procedure: COLONOSCOPY;  Surgeon: Kati Lin MD;  Location: [facility] ENDOSCOPY;  Service: Gastroenterology;  Laterality: N/A;   • ENDOSCOPY N/A 9/14/2020    Procedure: ESOPHAGOGASTRODUODENOSCOPY;  Surgeon: Kati Lin MD;  Location: [facility] ENDOSCOPY;  Service: Gastroenterology;  Laterality: N/A;   • INJECTION OF MEDICATION  02/05/2013    Rocephin (1)      • INJECTION OF MEDICATION  02/05/2013    Toradol (1)      • TONSILLECTOMY AND ADENOIDECTOMY           Current Outpatient Medications:   •  clindamycin (CLEOCIN) 300 MG capsule, Take 1 capsule by mouth 3 (Three) Times a Day. Till gone, Disp: 30 capsule, Rfl: 0  •  divalproex (DEPAKOTE) 500 MG 24 hr tablet, tablet, extended release 24 hr 2 times per day Oral, Disp: , Rfl: 0  •  escitalopram (LEXAPRO) 10 MG tablet, Take 10 mg by mouth Daily., Disp: , Rfl:   •  ziprasidone (Geodon) 20 MG capsule, Take 20 mg by mouth 2 (Two) Times a Day With Meals., Disp: , Rfl:   •  ziprasidone (Geodon) 80 MG capsule, Take 80 mg by  mouth 2 (Two) Times a Day With Meals., Disp: , Rfl:   •  Lactobacillus (FLORANEX PO), Daily., Disp: , Rfl:   •  levoFLOXacin (LEVAQUIN) 500 MG tablet, Take 1 tablet by mouth Daily for 7 days., Disp: 7 tablet, Rfl: 0  •  metroNIDAZOLE (Flagyl) 500 MG tablet, Take 1 tablet by mouth 3 (Three) Times a Day for 7 days., Disp: 21 tablet, Rfl: 0  •  nicotine (Nicoderm CQ) 14 MG/24HR patch, Place 1 patch on the skin as directed by provider Daily., Disp: 28 each, Rfl: 1  •  nicotine (Nicoderm CQ) 21 MG/24HR patch, Place 1 patch on the skin as directed by provider Daily., Disp: 28 each, Rfl: 1  •  nicotine (Nicoderm CQ) 7 MG/24HR patch, Place 1 patch on the skin as directed by provider Daily., Disp: 28 patch, Rfl: 1  •  omeprazole (priLOSEC) 40 MG capsule, Take 40 mg by mouth Daily., Disp: , Rfl:     No Known Allergies    Family History   Problem Relation Age of Onset   • Hypertension Other    • Thyroid disease Other    • Cancer Mother    • Cancer Maternal Uncle    • Diabetes Maternal Grandmother    • Stroke Maternal Grandfather        Social History     Socioeconomic History   • Marital status: Single     Spouse name: Not on file   • Number of children: Not on file   • Years of education: Not on file   • Highest education level: Not on file   Tobacco Use   • Smoking status: Current Some Day Smoker     Types: Cigarettes     Last attempt to quit: 2020     Years since quittin.2   • Smokeless tobacco: Never Used   Substance and Sexual Activity   • Alcohol use: No   • Drug use: Never   • Sexual activity: Defer       Review of Systems   Constitutional: Negative.    HENT: Negative.    Eyes: Negative.    Respiratory: Negative.    Cardiovascular: Negative.    Gastrointestinal: Negative.    Endocrine: Negative.    Genitourinary: Negative.    Musculoskeletal: Negative.    Skin: Negative.    Allergic/Immunologic: Negative.    Neurological: Negative.    Hematological: Negative.    Psychiatric/Behavioral: Negative.        Physical  Exam  Genitourinary:              ASSESSMENT    Diagnoses and all orders for this visit:    1. Fistula-in-ano (Primary)    Other orders  -     levoFLOXacin (LEVAQUIN) 500 MG tablet; Take 1 tablet by mouth Daily for 7 days.  Dispense: 7 tablet; Refill: 0  -     metroNIDAZOLE (Flagyl) 500 MG tablet; Take 1 tablet by mouth 3 (Three) Times a Day for 7 days.  Dispense: 21 tablet; Refill: 0        PLAN    1.  Recheck on Monday; he may have to have surgery next week.              This document has been electronically signed by Jorge Luis Timmons MD on August 6, 2021 22:50 CDT

## 2021-08-06 NOTE — PATIENT INSTRUCTIONS
"BMI for Adults  What is BMI?  Body mass index (BMI) is a number that is calculated from a person's weight and height. BMI can help estimate how much of a person's weight is composed of fat. BMI does not measure body fat directly. Rather, it is an alternative to procedures that directly measure body fat, which can be difficult and expensive.  BMI can help identify people who may be at higher risk for certain medical problems.  What are BMI measurements used for?  BMI is used as a screening tool to identify possible weight problems. It helps determine whether a person is obese, overweight, a healthy weight, or underweight.  BMI is useful for:  · Identifying a weight problem that may be related to a medical condition or may increase the risk for medical problems.  · Promoting changes, such as changes in diet and exercise, to help reach a healthy weight. BMI screening can be repeated to see if these changes are working.  How is BMI calculated?  BMI involves measuring your weight in relation to your height. Both height and weight are measured, and the BMI is calculated from those numbers. This can be done either in English (U[place] or metric measurements. Note that charts and online BMI calculators are available to help you find your BMI quickly and easily without having to do these calculations yourself.  To calculate your BMI in English ([place] measurements:    1. Measure your weight in pounds (lb).  2. Multiply the number of pounds by 703.  ? For example, for a person who weighs 180 lb, multiply that number by 703, which equals 126,540.  3. Measure your height in inches. Then multiply that number by itself to get a measurement called \"inches squared.\"  ? For example, for a person who is 70 inches tall, the \"inches squared\" measurement is 70 inches x 70 inches, which equals 4,900 inches squared.  4. Divide the total from step 2 (number of lb x 703) by the total from step 3 (inches squared): 126,540 ÷ 4,900 = 25.8. This is " "your BMI.  To calculate your BMI in metric measurements:  1. Measure your weight in kilograms (kg).  2. Measure your height in meters (m). Then multiply that number by itself to get a measurement called \"meters squared.\"  ? For example, for a person who is 1.75 m tall, the \"meters squared\" measurement is 1.75 m x 1.75 m, which is equal to 3.1 meters squared.  3. Divide the number of kilograms (your weight) by the meters squared number. In this example: 70 ÷ 3.1 = 22.6. This is your BMI.  What do the results mean?  BMI charts are used to identify whether you are underweight, normal weight, overweight, or obese. The following guidelines will be used:  · Underweight: BMI less than 18.5.  · Normal weight: BMI between 18.5 and 24.9.  · Overweight: BMI between 25 and 29.9.  · Obese: BMI of 30 or above.  Keep these notes in mind:  · Weight includes both fat and muscle, so someone with a muscular build, such as an athlete, may have a BMI that is higher than 24.9. In cases like these, BMI is not an accurate measure of body fat.  · To determine if excess body fat is the cause of a BMI of 25 or higher, further assessments may need to be done by a health care provider.  · BMI is usually interpreted in the same way for men and women.  Where to find more information  For more information about BMI, including tools to quickly calculate your BMI, go to these websites:  · Centers for Disease Control and Prevention: www.cdc.gov  · American Heart Association: www.heart.org  · National Heart, Lung, and Blood [place]: www.nhlbi.nih.gov  Summary  · Body mass index (BMI) is a number that is calculated from a person's weight and height.  · BMI may help estimate how much of a person's weight is composed of fat. BMI can help identify those who may be at higher risk for certain medical problems.  · BMI can be measured using English measurements or metric measurements.  · BMI charts are used to identify whether you are underweight, normal " weight, overweight, or obese.  This information is not intended to replace advice given to you by your health care provider. Make sure you discuss any questions you have with your health care provider.  Document Revised: 09/09/2020 Document Reviewed: 07/17/2020  Elsevier Patient Education © 2021 [employer]

## 2021-08-09 ENCOUNTER — OFFICE VISIT (OUTPATIENT)
Dept: SURGERY | Facility: CLINIC | Age: 41
End: 2021-08-09

## 2021-08-09 VITALS
BODY MASS INDEX: 27.14 KG/M2 | WEIGHT: 204.8 LBS | DIASTOLIC BLOOD PRESSURE: 82 MMHG | HEART RATE: 100 BPM | SYSTOLIC BLOOD PRESSURE: 110 MMHG | HEIGHT: 73 IN | TEMPERATURE: 98.3 F

## 2021-08-09 DIAGNOSIS — K60.3 ANAL FISTULA: Primary | ICD-10-CM

## 2021-08-09 PROBLEM — F17.200 SMOKER: Chronic | Status: RESOLVED | Noted: 2021-03-05 | Resolved: 2021-08-09

## 2021-08-09 PROCEDURE — 99213 OFFICE O/P EST LOW 20 MIN: CPT | Performed by: SURGERY

## 2021-08-09 RX ORDER — SODIUM CHLORIDE, SODIUM LACTATE, POTASSIUM CHLORIDE, CALCIUM CHLORIDE 600; 310; 30; 20 MG/100ML; MG/100ML; MG/100ML; MG/100ML
100 INJECTION, SOLUTION INTRAVENOUS CONTINUOUS
Status: CANCELLED | OUTPATIENT
Start: 2021-08-13

## 2021-08-09 NOTE — PATIENT INSTRUCTIONS
"BMI for Adults  What is BMI?  Body mass index (BMI) is a number that is calculated from a person's weight and height. BMI can help estimate how much of a person's weight is composed of fat. BMI does not measure body fat directly. Rather, it is an alternative to procedures that directly measure body fat, which can be difficult and expensive.  BMI can help identify people who may be at higher risk for certain medical problems.  What are BMI measurements used for?  BMI is used as a screening tool to identify possible weight problems. It helps determine whether a person is obese, overweight, a healthy weight, or underweight.  BMI is useful for:  · Identifying a weight problem that may be related to a medical condition or may increase the risk for medical problems.  · Promoting changes, such as changes in diet and exercise, to help reach a healthy weight. BMI screening can be repeated to see if these changes are working.  How is BMI calculated?  BMI involves measuring your weight in relation to your height. Both height and weight are measured, and the BMI is calculated from those numbers. This can be done either in English ([place] or metric measurements. Note that charts and online BMI calculators are available to help you find your BMI quickly and easily without having to do these calculations yourself.  To calculate your BMI in English ([place] measurements:    1. Measure your weight in pounds (lb).  2. Multiply the number of pounds by 703.  ? For example, for a person who weighs 180 lb, multiply that number by 703, which equals 126,540.  3. Measure your height in inches. Then multiply that number by itself to get a measurement called \"inches squared.\"  ? For example, for a person who is 70 inches tall, the \"inches squared\" measurement is 70 inches x 70 inches, which equals 4,900 inches squared.  4. Divide the total from step 2 (number of lb x 703) by the total from step 3 (inches squared): 126,540 ÷ 4,900 = 25.8. This is " "your BMI.  To calculate your BMI in metric measurements:  1. Measure your weight in kilograms (kg).  2. Measure your height in meters (m). Then multiply that number by itself to get a measurement called \"meters squared.\"  ? For example, for a person who is 1.75 m tall, the \"meters squared\" measurement is 1.75 m x 1.75 m, which is equal to 3.1 meters squared.  3. Divide the number of kilograms (your weight) by the meters squared number. In this example: 70 ÷ 3.1 = 22.6. This is your BMI.  What do the results mean?  BMI charts are used to identify whether you are underweight, normal weight, overweight, or obese. The following guidelines will be used:  · Underweight: BMI less than 18.5.  · Normal weight: BMI between 18.5 and 24.9.  · Overweight: BMI between 25 and 29.9.  · Obese: BMI of 30 or above.  Keep these notes in mind:  · Weight includes both fat and muscle, so someone with a muscular build, such as an athlete, may have a BMI that is higher than 24.9. In cases like these, BMI is not an accurate measure of body fat.  · To determine if excess body fat is the cause of a BMI of 25 or higher, further assessments may need to be done by a health care provider.  · BMI is usually interpreted in the same way for men and women.  Where to find more information  For more information about BMI, including tools to quickly calculate your BMI, go to these websites:  · Centers for Disease Control and Prevention: www.cdc.gov  · American Heart Association: www.heart.org  · National Heart, Lung, and Blood [place]: www.nhlbi.nih.gov  Summary  · Body mass index (BMI) is a number that is calculated from a person's weight and height.  · BMI may help estimate how much of a person's weight is composed of fat. BMI can help identify those who may be at higher risk for certain medical problems.  · BMI can be measured using English measurements or metric measurements.  · BMI charts are used to identify whether you are underweight, normal " weight, overweight, or obese.  This information is not intended to replace advice given to you by your health care provider. Make sure you discuss any questions you have with your health care provider.  Document Revised: 09/09/2020 Document Reviewed: 07/17/2020  Elsevier Patient Education © 2021 [employer].

## 2021-08-10 ENCOUNTER — LAB (OUTPATIENT)
Dept: LAB | Facility: HOSPITAL | Age: 41
End: 2021-08-10

## 2021-08-10 ENCOUNTER — PRE-ADMISSION TESTING (OUTPATIENT)
Dept: PREADMISSION TESTING | Facility: HOSPITAL | Age: 41
End: 2021-08-10

## 2021-08-10 VITALS
HEIGHT: 73 IN | HEART RATE: 95 BPM | DIASTOLIC BLOOD PRESSURE: 70 MMHG | WEIGHT: 204 LBS | SYSTOLIC BLOOD PRESSURE: 110 MMHG | BODY MASS INDEX: 27.04 KG/M2 | RESPIRATION RATE: 18 BRPM | OXYGEN SATURATION: 97 %

## 2021-08-10 DIAGNOSIS — Z01.818 PREOP TESTING: Primary | ICD-10-CM

## 2021-08-10 LAB — SARS-COV-2 N GENE RESP QL NAA+PROBE: NOT DETECTED

## 2021-08-10 PROCEDURE — 87635 SARS-COV-2 COVID-19 AMP PRB: CPT

## 2021-08-10 PROCEDURE — C9803 HOPD COVID-19 SPEC COLLECT: HCPCS

## 2021-08-10 RX ORDER — NICOTINE 21 MG/24HR
1 PATCH, TRANSDERMAL 24 HOURS TRANSDERMAL DAILY PRN
COMMUNITY
End: 2022-06-09

## 2021-08-10 NOTE — PROGRESS NOTES
Chief Complaint   Patient presents with   • Follow-up     Recheck Perirectal Abscess        HPI  40-year-old man seen last Friday with a draining fistula in anal.  He has been doing well with oral antibiotics and presents today for consideration of fistulotomy.  Said no previous history of fever or chills.  He previously has had an anal fistula same side several years ago.  Past Medical History:   Diagnosis Date   • Anorectal fistula     fistula in ano   • Bipolar disorder (CMS/HCC)    • Chronic undifferentiated schizophrenia (CMS/HCC)    • Dental abscess    • Dental caries    • External hordeolum    • Facial swelling    • On long term drug therapy    • Rectal discharge    • Tachycardia    • Tobacco dependence syndrome    • Vitamin deficiency        Past Surgical History:   Procedure Laterality Date   • ANAL FISTULA REPAIR  03/07/2014    Anal fistula, left side, 5 cm from the anus. A left sided fistula-in-ano with connection to the posterior midline of the anus.   • COLONOSCOPY N/A 9/14/2020    Procedure: COLONOSCOPY;  Surgeon: Kati Lin MD;  Location: [facility] ENDOSCOPY;  Service: Gastroenterology;  Laterality: N/A;   • ENDOSCOPY N/A 9/14/2020    Procedure: ESOPHAGOGASTRODUODENOSCOPY;  Surgeon: Kati Lin MD;  Location: [facility] ENDOSCOPY;  Service: Gastroenterology;  Laterality: N/A;   • INJECTION OF MEDICATION  02/05/2013    Rocephin (1)      • INJECTION OF MEDICATION  02/05/2013    Toradol (1)      • TONSILLECTOMY AND ADENOIDECTOMY           Current Outpatient Medications:   •  divalproex (DEPAKOTE) 500 MG 24 hr tablet, tablet, extended release 24 hr 2 times per day Oral, Disp: , Rfl: 0  •  escitalopram (LEXAPRO) 10 MG tablet, Take 10 mg by mouth Daily., Disp: , Rfl:   •  Lactobacillus (FLORANEX PO), Daily., Disp: , Rfl:   •  levoFLOXacin (LEVAQUIN) 500 MG tablet, Take 1 tablet by mouth Daily for 7 days., Disp: 7 tablet, Rfl: 0  •  metroNIDAZOLE (Flagyl) 500 MG tablet, Take 1 tablet by mouth 3  (Three) Times a Day for 7 days., Disp: 21 tablet, Rfl: 0  •  omeprazole (priLOSEC) 40 MG capsule, Take 40 mg by mouth Daily., Disp: , Rfl:   •  ziprasidone (Geodon) 20 MG capsule, Take 20 mg by mouth 2 (Two) Times a Day With Meals., Disp: , Rfl:   •  ziprasidone (Geodon) 80 MG capsule, Take 80 mg by mouth 2 (Two) Times a Day With Meals., Disp: , Rfl:   •  clindamycin (CLEOCIN) 300 MG capsule, Take 1 capsule by mouth 3 (Three) Times a Day. Till gone, Disp: 30 capsule, Rfl: 0  •  nicotine (Nicoderm CQ) 14 MG/24HR patch, Place 1 patch on the skin as directed by provider Daily., Disp: 28 each, Rfl: 1  •  nicotine (Nicoderm CQ) 21 MG/24HR patch, Place 1 patch on the skin as directed by provider Daily., Disp: 28 each, Rfl: 1  •  nicotine (Nicoderm CQ) 7 MG/24HR patch, Place 1 patch on the skin as directed by provider Daily., Disp: 28 patch, Rfl: 1    No Known Allergies    Family History   Problem Relation Age of Onset   • Hypertension Other    • Thyroid disease Other    • Cancer Mother    • Cancer Maternal Uncle    • Diabetes Maternal Grandmother    • Stroke Maternal Grandfather        Social History     Socioeconomic History   • Marital status: Single     Spouse name: Not on file   • Number of children: Not on file   • Years of education: Not on file   • Highest education level: Not on file   Tobacco Use   • Smoking status: Current Some Day Smoker     Types: Cigarettes     Last attempt to quit: 2020     Years since quittin.2   • Smokeless tobacco: Never Used   Substance and Sexual Activity   • Alcohol use: No   • Drug use: Never   • Sexual activity: Defer       Review of Systems   Constitutional: Negative for activity change, appetite change, chills and fever.   HENT: Negative for hearing loss, nosebleeds and trouble swallowing.    Cardiovascular: Negative for chest pain, palpitations and leg swelling.   Gastrointestinal: Negative for abdominal distention, abdominal pain, anal bleeding, blood in stool,  constipation, diarrhea, nausea, rectal pain and vomiting.   Endocrine: Negative for cold intolerance, heat intolerance, polydipsia and polyuria.   Genitourinary: Negative for decreased urine volume, difficulty urinating, dysuria, enuresis, frequency, hematuria and urgency.   Musculoskeletal: Negative for arthralgias, back pain, gait problem, myalgias and neck pain.   Skin: Negative for pallor, rash and wound.   Allergic/Immunologic: Negative for immunocompromised state.   Neurological: Negative for dizziness, seizures, weakness, light-headedness, numbness and headaches.   Psychiatric/Behavioral: Negative for agitation and behavioral problems. The patient is not nervous/anxious.        Physical Exam  Vitals reviewed.   Constitutional:       Appearance: Normal appearance.   HENT:      Head: Normocephalic and atraumatic.      Nose: Nose normal.   Eyes:      Extraocular Movements: Extraocular movements intact.      Pupils: Pupils are equal, round, and reactive to light.   Cardiovascular:      Rate and Rhythm: Normal rate and regular rhythm.      Pulses: Normal pulses.   Pulmonary:      Effort: Pulmonary effort is normal. No respiratory distress.   Abdominal:      General: Abdomen is flat. Bowel sounds are normal.      Palpations: Abdomen is soft.   Genitourinary:      Musculoskeletal:         General: Normal range of motion.      Cervical back: Normal range of motion and neck supple.   Neurological:      Mental Status: He is alert.           ASSESSMENT    Diagnoses and all orders for this visit:    1. Anal fistula (Primary)  -     Case Request; Standing  -     lactated ringers infusion  -     ceFOXitin (MEFOXIN) 2 g in sodium chloride 0.9 % 100 mL IVPB  -     Case Request    Other orders  -     Follow Anesthesia Guidelines / Standing Orders; Future  -     Provide Chlorhexidine Skin Prep Wipes and Instructions; Future  -     Follow Anesthesia Guidelines / Standing Orders; Standing  -     Obtain Informed Consent;  Standing  -     Place sequential compression device- to be placed on patient in Pre-op; Standing  -     Verify / Perform Chlorhexidine Skin Prep; Standing        PLAN    1.  Anal fistulotomy is planned for later this week.    Procedures been explained with the risks of bleeding, infection, open wounds, poor wound healing, scarring, recurrence.  He and his family understand and agree.              This document has been electronically signed by Jorge Luis Timmons MD on August 9, 2021 19:16 CDT

## 2021-08-13 ENCOUNTER — ANESTHESIA EVENT (OUTPATIENT)
Dept: PERIOP | Facility: HOSPITAL | Age: 41
End: 2021-08-13

## 2021-08-13 ENCOUNTER — HOSPITAL ENCOUNTER (OUTPATIENT)
Facility: HOSPITAL | Age: 41
Setting detail: HOSPITAL OUTPATIENT SURGERY
Discharge: HOME | End: 2021-08-13
Attending: SURGERY | Admitting: SURGERY

## 2021-08-13 ENCOUNTER — ANESTHESIA (OUTPATIENT)
Dept: PERIOP | Facility: HOSPITAL | Age: 41
End: 2021-08-13

## 2021-08-13 VITALS
HEIGHT: 73 IN | TEMPERATURE: 97 F | SYSTOLIC BLOOD PRESSURE: 126 MMHG | DIASTOLIC BLOOD PRESSURE: 76 MMHG | WEIGHT: 202.82 LBS | BODY MASS INDEX: 26.88 KG/M2 | OXYGEN SATURATION: 99 % | RESPIRATION RATE: 20 BRPM | HEART RATE: 73 BPM

## 2021-08-13 DIAGNOSIS — K60.3 ANAL FISTULA: Primary | ICD-10-CM

## 2021-08-13 PROCEDURE — 46280 REMOVE ANAL FIST COMPLEX: CPT | Performed by: SURGERY

## 2021-08-13 PROCEDURE — 25010000002 PROPOFOL 10 MG/ML EMULSION: Performed by: NURSE ANESTHETIST, CERTIFIED REGISTERED

## 2021-08-13 PROCEDURE — 25010000002 MIDAZOLAM PER 1 MG: Performed by: NURSE ANESTHETIST, CERTIFIED REGISTERED

## 2021-08-13 PROCEDURE — 25010000002 CEFOXITIN: Performed by: SURGERY

## 2021-08-13 PROCEDURE — 25010000002 FENTANYL CITRATE (PF) 50 MCG/ML SOLUTION: Performed by: NURSE ANESTHETIST, CERTIFIED REGISTERED

## 2021-08-13 PROCEDURE — 25010000002 SUCCINYLCHOLINE PER 20 MG: Performed by: NURSE ANESTHETIST, CERTIFIED REGISTERED

## 2021-08-13 PROCEDURE — 25010000003 MEPERIDINE PER 100 MG: Performed by: NURSE ANESTHETIST, CERTIFIED REGISTERED

## 2021-08-13 PROCEDURE — 25010000002 DEXAMETHASONE PER 1 MG: Performed by: NURSE ANESTHETIST, CERTIFIED REGISTERED

## 2021-08-13 RX ORDER — DIPHENHYDRAMINE HCL 25 MG
25 CAPSULE ORAL
Status: DISCONTINUED | OUTPATIENT
Start: 2021-08-13 | End: 2021-08-13 | Stop reason: HOSPADM

## 2021-08-13 RX ORDER — PROPOFOL 10 MG/ML
VIAL (ML) INTRAVENOUS AS NEEDED
Status: DISCONTINUED | OUTPATIENT
Start: 2021-08-13 | End: 2021-08-13 | Stop reason: SURG

## 2021-08-13 RX ORDER — ACETAMINOPHEN 650 MG/1
650 SUPPOSITORY RECTAL ONCE AS NEEDED
Status: DISCONTINUED | OUTPATIENT
Start: 2021-08-13 | End: 2021-08-13 | Stop reason: HOSPADM

## 2021-08-13 RX ORDER — LIDOCAINE HYDROCHLORIDE 20 MG/ML
INJECTION, SOLUTION INFILTRATION; PERINEURAL AS NEEDED
Status: DISCONTINUED | OUTPATIENT
Start: 2021-08-13 | End: 2021-08-13 | Stop reason: SURG

## 2021-08-13 RX ORDER — DEXAMETHASONE SODIUM PHOSPHATE 4 MG/ML
INJECTION, SOLUTION INTRA-ARTICULAR; INTRALESIONAL; INTRAMUSCULAR; INTRAVENOUS; SOFT TISSUE AS NEEDED
Status: DISCONTINUED | OUTPATIENT
Start: 2021-08-13 | End: 2021-08-13 | Stop reason: SURG

## 2021-08-13 RX ORDER — ONDANSETRON 2 MG/ML
4 INJECTION INTRAMUSCULAR; INTRAVENOUS ONCE AS NEEDED
Status: DISCONTINUED | OUTPATIENT
Start: 2021-08-13 | End: 2021-08-13 | Stop reason: HOSPADM

## 2021-08-13 RX ORDER — DIPHENHYDRAMINE HYDROCHLORIDE 50 MG/ML
12.5 INJECTION INTRAMUSCULAR; INTRAVENOUS
Status: DISCONTINUED | OUTPATIENT
Start: 2021-08-13 | End: 2021-08-13 | Stop reason: HOSPADM

## 2021-08-13 RX ORDER — MIDAZOLAM HYDROCHLORIDE 1 MG/ML
INJECTION INTRAMUSCULAR; INTRAVENOUS AS NEEDED
Status: DISCONTINUED | OUTPATIENT
Start: 2021-08-13 | End: 2021-08-13 | Stop reason: SURG

## 2021-08-13 RX ORDER — ACETAMINOPHEN 325 MG/1
650 TABLET ORAL ONCE AS NEEDED
Status: DISCONTINUED | OUTPATIENT
Start: 2021-08-13 | End: 2021-08-13 | Stop reason: HOSPADM

## 2021-08-13 RX ORDER — ALBUTEROL SULFATE 2.5 MG/3ML
2.5 SOLUTION RESPIRATORY (INHALATION) ONCE AS NEEDED
Status: DISCONTINUED | OUTPATIENT
Start: 2021-08-13 | End: 2021-08-13 | Stop reason: HOSPADM

## 2021-08-13 RX ORDER — HYDROCODONE BITARTRATE AND ACETAMINOPHEN 7.5; 325 MG/1; MG/1
1 TABLET ORAL EVERY 6 HOURS PRN
Qty: 18 TABLET | Refills: 0 | Status: SHIPPED | OUTPATIENT
Start: 2021-08-13 | End: 2021-09-07

## 2021-08-13 RX ORDER — ROCURONIUM BROMIDE 10 MG/ML
INJECTION, SOLUTION INTRAVENOUS AS NEEDED
Status: DISCONTINUED | OUTPATIENT
Start: 2021-08-13 | End: 2021-08-13 | Stop reason: SURG

## 2021-08-13 RX ORDER — SODIUM CHLORIDE, SODIUM LACTATE, POTASSIUM CHLORIDE, CALCIUM CHLORIDE 600; 310; 30; 20 MG/100ML; MG/100ML; MG/100ML; MG/100ML
100 INJECTION, SOLUTION INTRAVENOUS CONTINUOUS
Status: DISCONTINUED | OUTPATIENT
Start: 2021-08-13 | End: 2021-08-13 | Stop reason: HOSPADM

## 2021-08-13 RX ORDER — SUCCINYLCHOLINE CHLORIDE 20 MG/ML
INJECTION INTRAMUSCULAR; INTRAVENOUS AS NEEDED
Status: DISCONTINUED | OUTPATIENT
Start: 2021-08-13 | End: 2021-08-13 | Stop reason: SURG

## 2021-08-13 RX ORDER — NALOXONE HCL 0.4 MG/ML
0.4 VIAL (ML) INJECTION AS NEEDED
Status: DISCONTINUED | OUTPATIENT
Start: 2021-08-13 | End: 2021-08-13 | Stop reason: HOSPADM

## 2021-08-13 RX ORDER — MEPERIDINE HYDROCHLORIDE 25 MG/ML
12.5 INJECTION INTRAMUSCULAR; INTRAVENOUS; SUBCUTANEOUS
Status: COMPLETED | OUTPATIENT
Start: 2021-08-13 | End: 2021-08-13

## 2021-08-13 RX ORDER — FENTANYL CITRATE 50 UG/ML
INJECTION, SOLUTION INTRAMUSCULAR; INTRAVENOUS AS NEEDED
Status: DISCONTINUED | OUTPATIENT
Start: 2021-08-13 | End: 2021-08-13 | Stop reason: SURG

## 2021-08-13 RX ADMIN — PROPOFOL 200 MG: 10 INJECTION, EMULSION INTRAVENOUS at 14:44

## 2021-08-13 RX ADMIN — FENTANYL CITRATE 50 MCG: 50 INJECTION, SOLUTION INTRAMUSCULAR; INTRAVENOUS at 15:09

## 2021-08-13 RX ADMIN — ROCURONIUM BROMIDE 10 MG: 50 INJECTION INTRAVENOUS at 14:44

## 2021-08-13 RX ADMIN — SODIUM CHLORIDE, POTASSIUM CHLORIDE, SODIUM LACTATE AND CALCIUM CHLORIDE 100 ML/HR: 600; 310; 30; 20 INJECTION, SOLUTION INTRAVENOUS at 13:29

## 2021-08-13 RX ADMIN — SUCCINYLCHOLINE CHLORIDE 160 MG: 20 INJECTION, SOLUTION INTRAMUSCULAR; INTRAVENOUS at 14:45

## 2021-08-13 RX ADMIN — FENTANYL CITRATE 100 MCG: 50 INJECTION, SOLUTION INTRAMUSCULAR; INTRAVENOUS at 14:41

## 2021-08-13 RX ADMIN — LIDOCAINE HYDROCHLORIDE 100 MG: 20 INJECTION, SOLUTION INFILTRATION; PERINEURAL at 14:44

## 2021-08-13 RX ADMIN — MIDAZOLAM HYDROCHLORIDE 2 MG: 2 INJECTION, SOLUTION INTRAMUSCULAR; INTRAVENOUS at 14:38

## 2021-08-13 RX ADMIN — MEPERIDINE HYDROCHLORIDE 12.5 MG: 25 INJECTION, SOLUTION INTRAMUSCULAR; INTRAVENOUS; SUBCUTANEOUS at 16:34

## 2021-08-13 RX ADMIN — MEPERIDINE HYDROCHLORIDE 12.5 MG: 25 INJECTION, SOLUTION INTRAMUSCULAR; INTRAVENOUS; SUBCUTANEOUS at 16:26

## 2021-08-13 RX ADMIN — CEFOXITIN 2 G: 2 INJECTION, POWDER, FOR SOLUTION INTRAVENOUS at 14:56

## 2021-08-13 RX ADMIN — FENTANYL CITRATE 100 MCG: 50 INJECTION, SOLUTION INTRAMUSCULAR; INTRAVENOUS at 15:00

## 2021-08-13 RX ADMIN — DEXAMETHASONE SODIUM PHOSPHATE 4 MG: 4 INJECTION, SOLUTION INTRAMUSCULAR; INTRAVENOUS at 14:59

## 2021-08-13 NOTE — ANESTHESIA PREPROCEDURE EVALUATION
Anesthesia Evaluation     no history of anesthetic complications:  NPO Solid Status: > 8 hours  NPO Liquid Status: > 4 hours           Airway   Mallampati: III  TM distance: >3 FB  Neck ROM: full  Difficult intubation highly probable and Anterior  Dental    (+) upper dentures and poor dentition        Pulmonary - normal exam    breath sounds clear to auscultation  (+) a smoker Former,   (-) COPD, asthma, sleep apnea  Cardiovascular   Exercise tolerance: good (4-7 METS)    ECG reviewed  Rhythm: regular  Rate: normal    (+) valvular problems/murmurs murmur, murmur,   (-) hypertension, past MI, dysrhythmias, angina, cardiac stents, DVT, hyperlipidemia    ROS comment: Normal sinus rhythm  Normal ECG  When compared with ECG of 03-MAY-2016 14:54,  No significant change was found  Confirmed by HEATHERLY     Neuro/Psych  (+) psychiatric history Bipolar and Schizophrenia,     (-) seizures, TIA, CVA, headaches, weakness, numbness  GI/Hepatic/Renal/Endo    (+)  GERD well controlled,    (-) hepatitis, liver disease, no renal disease, diabetes, no thyroid disorder    Musculoskeletal (-) negative ROS    Abdominal    Substance History   (-) alcohol use, drug use     OB/GYN          Other        (-) history of cancer  ROS/Med Hx Other: depakote for schizophrenia    Anal fistula                  Anesthesia Plan    ASA 3     general   (Acevedo available)  intravenous induction     Anesthetic plan, all risks, benefits, and alternatives have been provided, discussed and informed consent has been obtained with: patient.

## 2021-08-13 NOTE — ANESTHESIA POSTPROCEDURE EVALUATION
Patient: Erickson Singh    Procedure Summary     Date: 08/13/21 Room / Location: [facility] OR  / [facility] OR    Anesthesia Start: 1440 Anesthesia Stop: 1537    Procedure: ANAL FISTULOTOMY (Left Rectum) Diagnosis:       Anal fistula      (Anal fistula [K60.3])    Surgeons: Jorge Luis Timmons MD Provider: Gabe Sibley MD    Anesthesia Type: general ASA Status: 3          Anesthesia Type: general    Vitals  Vitals Value Taken Time   /67 08/13/21 1533   Temp 97.3 °F (36.3 °C) 08/13/21 1533   Pulse 60 08/13/21 1533   Resp 12 08/13/21 1533   SpO2 99 % 08/13/21 1533           Post Anesthesia Care and Evaluation    Patient location during evaluation: PACU  Patient participation: waiting for patient participation  Level of consciousness: sleepy but conscious  Pain management: adequate  Airway patency: patent  Anesthetic complications: No anesthetic complications  PONV Status: none  Cardiovascular status: acceptable and hemodynamically stable  Respiratory status: acceptable, spontaneous ventilation, oral airway and face mask  Hydration status: acceptable    Comments: ---------------------------               08/13/21                      1533         ---------------------------   BP:          106/67         Pulse:         60           Resp:          12           Temp:   97.3 °F (36.3 °C)   SpO2:          99%         ---------------------------

## 2021-08-13 NOTE — ANESTHESIA PROCEDURE NOTES
Airway  Urgency: elective    Date/Time: 8/13/2021 2:47 PM  Airway not difficult    General Information and Staff    Patient location during procedure: OR  CRNA: Laura Metzger CRNA    Indications and Patient Condition  Indications for airway management: airway protection    Preoxygenated: yes  Mask difficulty assessment: 1 - vent by mask    Final Airway Details  Final airway type: endotracheal airway      Successful airway: ETT  Cuffed: yes   Successful intubation technique: video laryngoscopy  Facilitating devices/methods: intubating stylet  Endotracheal tube insertion site: oral  Blade: Gonsales  Blade size: 4  ETT size (mm): 7.5  Cormack-Lehane Classification: grade I - full view of glottis  Placement verified by: chest auscultation and capnometry   Measured from: lips  ETT/EBT  to lips (cm): 20  Number of attempts at approach: 1  Assessment: lips, teeth, and gum same as pre-op and atraumatic intubation

## 2021-08-16 ENCOUNTER — OFFICE VISIT (OUTPATIENT)
Dept: SURGERY | Facility: CLINIC | Age: 41
End: 2021-08-16

## 2021-08-16 VITALS
WEIGHT: 209.8 LBS | SYSTOLIC BLOOD PRESSURE: 118 MMHG | HEART RATE: 100 BPM | BODY MASS INDEX: 27.8 KG/M2 | HEIGHT: 73 IN | DIASTOLIC BLOOD PRESSURE: 70 MMHG | TEMPERATURE: 97.3 F

## 2021-08-16 DIAGNOSIS — K60.3 ANAL FISTULA: Primary | ICD-10-CM

## 2021-08-16 PROCEDURE — 99024 POSTOP FOLLOW-UP VISIT: CPT | Performed by: NURSE PRACTITIONER

## 2021-08-16 NOTE — PATIENT INSTRUCTIONS
"BMI for Adults  What is BMI?  Body mass index (BMI) is a number that is calculated from a person's weight and height. BMI can help estimate how much of a person's weight is composed of fat. BMI does not measure body fat directly. Rather, it is an alternative to procedures that directly measure body fat, which can be difficult and expensive.  BMI can help identify people who may be at higher risk for certain medical problems.  What are BMI measurements used for?  BMI is used as a screening tool to identify possible weight problems. It helps determine whether a person is obese, overweight, a healthy weight, or underweight.  BMI is useful for:  · Identifying a weight problem that may be related to a medical condition or may increase the risk for medical problems.  · Promoting changes, such as changes in diet and exercise, to help reach a healthy weight. BMI screening can be repeated to see if these changes are working.  How is BMI calculated?  BMI involves measuring your weight in relation to your height. Both height and weight are measured, and the BMI is calculated from those numbers. This can be done either in English ([place] or metric measurements. Note that charts and online BMI calculators are available to help you find your BMI quickly and easily without having to do these calculations yourself.  To calculate your BMI in English ([place] measurements:    1. Measure your weight in pounds (lb).  2. Multiply the number of pounds by 703.  ? For example, for a person who weighs 180 lb, multiply that number by 703, which equals 126,540.  3. Measure your height in inches. Then multiply that number by itself to get a measurement called \"inches squared.\"  ? For example, for a person who is 70 inches tall, the \"inches squared\" measurement is 70 inches x 70 inches, which equals 4,900 inches squared.  4. Divide the total from step 2 (number of lb x 703) by the total from step 3 (inches squared): 126,540 ÷ 4,900 = 25.8. This is " "your BMI.  To calculate your BMI in metric measurements:  1. Measure your weight in kilograms (kg).  2. Measure your height in meters (m). Then multiply that number by itself to get a measurement called \"meters squared.\"  ? For example, for a person who is 1.75 m tall, the \"meters squared\" measurement is 1.75 m x 1.75 m, which is equal to 3.1 meters squared.  3. Divide the number of kilograms (your weight) by the meters squared number. In this example: 70 ÷ 3.1 = 22.6. This is your BMI.  What do the results mean?  BMI charts are used to identify whether you are underweight, normal weight, overweight, or obese. The following guidelines will be used:  · Underweight: BMI less than 18.5.  · Normal weight: BMI between 18.5 and 24.9.  · Overweight: BMI between 25 and 29.9.  · Obese: BMI of 30 or above.  Keep these notes in mind:  · Weight includes both fat and muscle, so someone with a muscular build, such as an athlete, may have a BMI that is higher than 24.9. In cases like these, BMI is not an accurate measure of body fat.  · To determine if excess body fat is the cause of a BMI of 25 or higher, further assessments may need to be done by a health care provider.  · BMI is usually interpreted in the same way for men and women.  Where to find more information  For more information about BMI, including tools to quickly calculate your BMI, go to these websites:  · Centers for Disease Control and Prevention: www.cdc.gov  · American Heart Association: www.heart.org  · National Heart, Lung, and Blood [place]: www.nhlbi.nih.gov  Summary  · Body mass index (BMI) is a number that is calculated from a person's weight and height.  · BMI may help estimate how much of a person's weight is composed of fat. BMI can help identify those who may be at higher risk for certain medical problems.  · BMI can be measured using English measurements or metric measurements.  · BMI charts are used to identify whether you are underweight, normal " weight, overweight, or obese.  This information is not intended to replace advice given to you by your health care provider. Make sure you discuss any questions you have with your health care provider.  Document Revised: 09/09/2020 Document Reviewed: 07/17/2020  Elsevier Patient Education © 2021 [employer]    Calorie Counting for Weight Loss  Calories are units of energy. Your body needs a certain number of calories from food to keep going throughout the day. When you eat or drink more calories than your body needs, your body stores the extra calories mostly as fat. When you eat or drink fewer calories than your body needs, your body burns fat to get the energy it needs.  Calorie counting means keeping track of how many calories you eat and drink each day. Calorie counting can be helpful if you need to lose weight. If you eat fewer calories than your body needs, you should lose weight. Ask your health care provider what a healthy weight is for you.  For calorie counting to work, you will need to eat the right number of calories each day to lose a healthy amount of weight per week. A dietitian can help you figure out how many calories you need in a day and will suggest ways to reach your calorie goal.  · A healthy amount of weight to lose each week is usually 1-2 lb (0.5-0.9 kg). This usually means that your daily calorie intake should be reduced by 500-750 calories.  · Eating 1,200-1,500 calories a day can help most women lose weight.  · Eating 1,500-1,800 calories a day can help most men lose weight.  What do I need to know about calorie counting?  Work with your health care provider or dietitian to determine how many calories you should get each day. To meet your daily calorie goal, you will need to:  · Find out how many calories are in each food that you would like to eat. Try to do this before you eat.  · Decide how much of the food you plan to eat.  · Keep a food log. Do this by writing down what you ate and  how many calories it had.  To successfully lose weight, it is important to balance calorie counting with a healthy lifestyle that includes regular activity.  Where do I find calorie information?    The number of calories in a food can be found on a Nutrition Facts label. If a food does not have a Nutrition Facts label, try to look up the calories online or ask your dietitian for help.  Remember that calories are listed per serving. If you choose to have more than one serving of a food, you will have to multiply the calories per serving by the number of servings you plan to eat. For example, the label on a package of bread might say that a serving size is 1 slice and that there are 90 calories in a serving. If you eat 1 slice, you will have eaten 90 calories. If you eat 2 slices, you will have eaten 180 calories.  How do I keep a food log?  After each time that you eat, record the following in your food log as soon as possible:  · What you ate. Be sure to include toppings, sauces, and other extras on the food.  · How much you ate. This can be measured in cups, ounces, or number of items.  · How many calories were in each food and drink.  · The total number of calories in the food you ate.  Keep your food log near you, such as in a pocket-sized notebook or on an wale or website on your mobile phone. Some programs will calculate calories for you and show you how many calories you have left to meet your daily goal.  What are some portion-control tips?  · Know how many calories are in a serving. This will help you know how many servings you can have of a certain food.  · Use a measuring cup to measure serving sizes. You could also try weighing out portions on a kitchen scale. With time, you will be able to estimate serving sizes for some foods.  · Take time to put servings of different foods on your favorite plates or in your favorite bowls and cups so you know what a serving looks like.  · Try not to eat straight from a  food's packaging, such as from a bag or box. Eating straight from the package makes it hard to see how much you are eating and can lead to overeating. Put the amount you would like to eat in a cup or on a plate to make sure you are eating the right portion.  · Use smaller plates, glasses, and bowls for smaller portions and to prevent overeating.  · Try not to multitask. For example, avoid watching TV or using your computer while eating. If it is time to eat, sit down at a table and enjoy your food. This will help you recognize when you are full. It will also help you be more mindful of what and how much you are eating.  What are tips for following this plan?  Reading food labels  · Check the calorie count compared with the serving size. The serving size may be smaller than what you are used to eating.  · Check the source of the calories. Try to choose foods that are high in protein, fiber, and vitamins, and low in saturated fat, trans fat, and sodium.  Shopping  · Read nutrition labels while you shop. This will help you make healthy decisions about which foods to buy.  · Pay attention to nutrition labels for low-fat or fat-free foods. These foods sometimes have the same number of calories or more calories than the full-fat versions. They also often have added sugar, starch, or salt to make up for flavor that was removed with the fat.  · Make a grocery list of lower-calorie foods and stick to it.  Cooking  · Try to cook your favorite foods in a healthier way. For example, try baking instead of frying.  · Use low-fat dairy products.  Meal planning  · Use more fruits and vegetables. One-half of your plate should be fruits and vegetables.  · Include lean proteins, such as chicken, turkey, and fish.  Lifestyle  Each week, aim to do one of the following:  · 150 minutes of moderate exercise, such as walking.  · 75 minutes of vigorous exercise, such as running.  General information  · Know how many calories are in the foods  you eat most often. This will help you calculate calorie counts faster.  · Find a way of tracking calories that works for you. Get creative. Try different apps or programs if writing down calories does not work for you.  What foods should I eat?    · Eat nutritious foods. It is better to have a nutritious, high-calorie food, such as an avocado, than a food with few nutrients, such as a bag of potato chips.  · Use your calories on foods and drinks that will fill you up and will not leave you hungry soon after eating.  ? Examples of foods that fill you up are nuts and nut butters, vegetables, lean proteins, and high-fiber foods such as whole grains. High-fiber foods are foods with more than 5 g of fiber per serving.  · Pay attention to calories in drinks. Low-calorie drinks include water and unsweetened drinks.  The items listed above may not be a complete list of foods and beverages you can eat. Contact a dietitian for more information.  What foods should I limit?  Limit foods or drinks that are not good sources of vitamins, minerals, or protein or that are high in unhealthy fats. These include:  · Candy.  · Other sweets.  · Sodas, specialty coffee drinks, alcohol, and juice.  The items listed above may not be a complete list of foods and beverages you should avoid. Contact a dietitian for more information.  How do I count calories when eating out?  · Pay attention to portions. Often, portions are much larger when eating out. Try these tips to keep portions smaller:  ? Consider sharing a meal instead of getting your own.  ? If you get your own meal, eat only half of it. Before you start eating, ask for a container and put half of your meal into it.  ? When available, consider ordering smaller portions from the menu instead of full portions.  · Pay attention to your food and drink choices. Knowing the way food is cooked and what is included with the meal can help you eat fewer calories.  ? If calories are listed on  the menu, choose the lower-calorie options.  ? Choose dishes that include vegetables, fruits, whole grains, low-fat dairy products, and lean proteins.  ? Choose items that are boiled, broiled, grilled, or steamed. Avoid items that are buttered, battered, fried, or served with cream sauce. Items labeled as crispy are usually fried, unless stated otherwise.  ? Choose water, low-fat milk, unsweetened iced tea, or other drinks without added sugar. If you want an alcoholic beverage, choose a lower-calorie option, such as a glass of wine or light beer.  ? Ask for dressings, sauces, and syrups on the side. These are usually high in calories, so you should limit the amount you eat.  ? If you want a salad, choose a garden salad and ask for grilled meats. Avoid extra toppings such as santa, cheese, or fried items. Ask for the dressing on the side, or ask for olive oil and vinegar or lemon to use as dressing.  · Estimate how many servings of a food you are given. Knowing serving sizes will help you be aware of how much food you are eating at restaurants.  Where to find more information  · Centers for Disease Control and Prevention: www.cdc.gov  · [place]. Department of Agriculture: myplate.gov  Summary  · Calorie counting means keeping track of how many calories you eat and drink each day. If you eat fewer calories than your body needs, you should lose weight.  · A healthy amount of weight to lose per week is usually 1-2 lb (0.5-0.9 kg). This usually means reducing your daily calorie intake by 500-750 calories.  · The number of calories in a food can be found on a Nutrition Facts label. If a food does not have a Nutrition Facts label, try to look up the calories online or ask your dietitian for help.  · Use smaller plates, glasses, and bowls for smaller portions and to prevent overeating.  · Use your calories on foods and drinks that will fill you up and not leave you hungry shortly after a meal.  This information is not intended  to replace advice given to you by your health care provider. Make sure you discuss any questions you have with your health care provider.  Document Revised: 01/28/2021 Document Reviewed: 01/28/2021  Elsevier Patient Education © 2021 [employer]

## 2021-08-17 NOTE — PROGRESS NOTES
CHIEF COMPLAINT:   Chief Complaint   Patient presents with   • Post-op     Post operative Anal Fistulotomy and Seton Placement 8-13-21       HPI: This patient presents for a post-operative visit after undergoing anal fistulotomy and seton placement. Doing well. He states the rubber piece fell out a couple days ago. He claims drainage has slowed down. He denies significant pain or fever.  Denies issues with bowel function.     Physical Exam  Vitals reviewed.   Constitutional:       General: He is not in acute distress.     Appearance: Normal appearance. He is not ill-appearing, toxic-appearing or diaphoretic.   Genitourinary:      Skin:     General: Skin is warm and dry.   Neurological:      General: No focal deficit present.      Mental Status: He is alert and oriented to person, place, and time.   Psychiatric:         Mood and Affect: Mood normal.         Behavior: Behavior normal.         Thought Content: Thought content normal.         Judgment: Judgment normal.       Patient's Body mass index is 27.68 kg/m². indicating that he is overweight (BMI 25-29.9). Obesity-related health conditions include the following: GERD. Obesity is unchanged. BMI is is above average; BMI management plan is completed. Information added to AVS.      ASSESSMENT:    Diagnoses and all orders for this visit:    1. Anal fistula (Primary)        PLAN:  1. The patient will follow-up in one week for recheck or may be seen sooner if concerns arise.    2. May shower.   3. Local wound care as discussed.                      This document has been electronically signed by JOSE L Bryant on August 17, 2021 09:51 CDT

## 2021-08-20 ENCOUNTER — TELEPHONE (OUTPATIENT)
Dept: SURGERY | Facility: CLINIC | Age: 41
End: 2021-08-20

## 2021-08-20 NOTE — TELEPHONE ENCOUNTER
Pt wants to know if he can put something on his behind for irritation? He said it doesn't hurt. But it just feels dry. What would you recommend?

## 2021-08-23 ENCOUNTER — OFFICE VISIT (OUTPATIENT)
Dept: SURGERY | Facility: CLINIC | Age: 41
End: 2021-08-23

## 2021-08-23 VITALS
HEIGHT: 73 IN | HEART RATE: 95 BPM | WEIGHT: 204.8 LBS | BODY MASS INDEX: 27.14 KG/M2 | TEMPERATURE: 97.4 F | SYSTOLIC BLOOD PRESSURE: 120 MMHG | DIASTOLIC BLOOD PRESSURE: 84 MMHG

## 2021-08-23 DIAGNOSIS — K60.3 ANAL FISTULA: Primary | ICD-10-CM

## 2021-08-23 PROCEDURE — 99024 POSTOP FOLLOW-UP VISIT: CPT | Performed by: NURSE PRACTITIONER

## 2021-08-23 NOTE — PATIENT INSTRUCTIONS
"BMI for Adults  What is BMI?  Body mass index (BMI) is a number that is calculated from a person's weight and height. BMI can help estimate how much of a person's weight is composed of fat. BMI does not measure body fat directly. Rather, it is an alternative to procedures that directly measure body fat, which can be difficult and expensive.  BMI can help identify people who may be at higher risk for certain medical problems.  What are BMI measurements used for?  BMI is used as a screening tool to identify possible weight problems. It helps determine whether a person is obese, overweight, a healthy weight, or underweight.  BMI is useful for:  · Identifying a weight problem that may be related to a medical condition or may increase the risk for medical problems.  · Promoting changes, such as changes in diet and exercise, to help reach a healthy weight. BMI screening can be repeated to see if these changes are working.  How is BMI calculated?  BMI involves measuring your weight in relation to your height. Both height and weight are measured, and the BMI is calculated from those numbers. This can be done either in English ([place] or metric measurements. Note that charts and online BMI calculators are available to help you find your BMI quickly and easily without having to do these calculations yourself.  To calculate your BMI in English ([place] measurements:    1. Measure your weight in pounds (lb).  2. Multiply the number of pounds by 703.  ? For example, for a person who weighs 180 lb, multiply that number by 703, which equals 126,540.  3. Measure your height in inches. Then multiply that number by itself to get a measurement called \"inches squared.\"  ? For example, for a person who is 70 inches tall, the \"inches squared\" measurement is 70 inches x 70 inches, which equals 4,900 inches squared.  4. Divide the total from step 2 (number of lb x 703) by the total from step 3 (inches squared): 126,540 ÷ 4,900 = 25.8. This is " "your BMI.  To calculate your BMI in metric measurements:  1. Measure your weight in kilograms (kg).  2. Measure your height in meters (m). Then multiply that number by itself to get a measurement called \"meters squared.\"  ? For example, for a person who is 1.75 m tall, the \"meters squared\" measurement is 1.75 m x 1.75 m, which is equal to 3.1 meters squared.  3. Divide the number of kilograms (your weight) by the meters squared number. In this example: 70 ÷ 3.1 = 22.6. This is your BMI.  What do the results mean?  BMI charts are used to identify whether you are underweight, normal weight, overweight, or obese. The following guidelines will be used:  · Underweight: BMI less than 18.5.  · Normal weight: BMI between 18.5 and 24.9.  · Overweight: BMI between 25 and 29.9.  · Obese: BMI of 30 or above.  Keep these notes in mind:  · Weight includes both fat and muscle, so someone with a muscular build, such as an athlete, may have a BMI that is higher than 24.9. In cases like these, BMI is not an accurate measure of body fat.  · To determine if excess body fat is the cause of a BMI of 25 or higher, further assessments may need to be done by a health care provider.  · BMI is usually interpreted in the same way for men and women.  Where to find more information  For more information about BMI, including tools to quickly calculate your BMI, go to these websites:  · Centers for Disease Control and Prevention: www.cdc.gov  · American Heart Association: www.heart.org  · National Heart, Lung, and Blood [place]: www.nhlbi.nih.gov  Summary  · Body mass index (BMI) is a number that is calculated from a person's weight and height.  · BMI may help estimate how much of a person's weight is composed of fat. BMI can help identify those who may be at higher risk for certain medical problems.  · BMI can be measured using English measurements or metric measurements.  · BMI charts are used to identify whether you are underweight, normal " weight, overweight, or obese.  This information is not intended to replace advice given to you by your health care provider. Make sure you discuss any questions you have with your health care provider.  Document Revised: 09/09/2020 Document Reviewed: 07/17/2020  Elsevier Patient Education © 2021 [employer]

## 2021-08-24 NOTE — PROGRESS NOTES
CHIEF COMPLAINT:   Chief Complaint   Patient presents with   • Follow-up     Anal Fistulotomy and Seton Placement 8-13-21       HPI: This patient presents for a post-operative visit after undergoing anal fistulotomy and seton placement per Dr. Timmons.  He claims he is doing well. He is still having tenderness and a small amount of light pink drainage but claims it is improving. Both packing and seton have previously been removed.  He denies fever or chills.  Has been applying small amount of Cortizone 10 cream externally to alleviate irritation.      Physical Exam  Vitals reviewed.   Constitutional:       General: He is not in acute distress.     Appearance: Normal appearance. He is not ill-appearing, toxic-appearing or diaphoretic.   Genitourinary:      Skin:     General: Skin is warm and dry.   Neurological:      General: No focal deficit present.      Mental Status: He is alert and oriented to person, place, and time.   Psychiatric:         Mood and Affect: Mood normal.         Behavior: Behavior normal.         Thought Content: Thought content normal.         Judgment: Judgment normal.         ASSESSMENT:    Diagnoses and all orders for this visit:    1. Anal fistula (Primary)        PLAN:    1. The patient will follow-up in 2 weeks or sooner if concerns arise.   2. Continue wound care as discussed.                      This document has been electronically signed by JOSE L Bryant on August 24, 2021 09:42 CDT

## 2021-09-07 ENCOUNTER — OFFICE VISIT (OUTPATIENT)
Dept: SURGERY | Facility: CLINIC | Age: 41
End: 2021-09-07

## 2021-09-07 VITALS
HEART RATE: 99 BPM | TEMPERATURE: 97.3 F | DIASTOLIC BLOOD PRESSURE: 84 MMHG | WEIGHT: 201.6 LBS | HEIGHT: 73 IN | SYSTOLIC BLOOD PRESSURE: 120 MMHG | BODY MASS INDEX: 26.72 KG/M2

## 2021-09-07 DIAGNOSIS — K60.3 ANAL FISTULA: Primary | ICD-10-CM

## 2021-09-07 PROCEDURE — 99024 POSTOP FOLLOW-UP VISIT: CPT | Performed by: NURSE PRACTITIONER

## 2021-09-07 NOTE — PROGRESS NOTES
CHIEF COMPLAINT:   Chief Complaint   Patient presents with   • Follow-up     Anal Fistulotomy and Seton Placement 8-13-21       HPI: This patient presents for a post-operative visit after undergoing anal fistulotomy and seton placement per Dr. Timmons.  He claims he is doing well. He now denies tenderness or drainage. Both packing and seton have previously been removed.  He denies fever or chills.          Physical Exam  Vitals reviewed.   Constitutional:       General: He is not in acute distress.     Appearance: Normal appearance. He is not ill-appearing, toxic-appearing or diaphoretic.   Genitourinary:      Skin:     General: Skin is warm and dry.   Neurological:      General: No focal deficit present.      Mental Status: He is alert and oriented to person, place, and time.   Psychiatric:         Mood and Affect: Mood normal.         Behavior: Behavior normal.         Thought Content: Thought content normal.         Judgment: Judgment normal.         ASSESSMENT:    Diagnoses and all orders for this visit:    1. Anal fistula (Primary)        PLAN:    1. Continue current wound care.  Follow up on PRN basis if additional concerns arise.                    This document has been electronically signed by JOSE L Bryant on September 7, 2021 14:34 CDT

## 2021-09-07 NOTE — PATIENT INSTRUCTIONS
"BMI for Adults  What is BMI?  Body mass index (BMI) is a number that is calculated from a person's weight and height. BMI can help estimate how much of a person's weight is composed of fat. BMI does not measure body fat directly. Rather, it is an alternative to procedures that directly measure body fat, which can be difficult and expensive.  BMI can help identify people who may be at higher risk for certain medical problems.  What are BMI measurements used for?  BMI is used as a screening tool to identify possible weight problems. It helps determine whether a person is obese, overweight, a healthy weight, or underweight.  BMI is useful for:  · Identifying a weight problem that may be related to a medical condition or may increase the risk for medical problems.  · Promoting changes, such as changes in diet and exercise, to help reach a healthy weight. BMI screening can be repeated to see if these changes are working.  How is BMI calculated?  BMI involves measuring your weight in relation to your height. Both height and weight are measured, and the BMI is calculated from those numbers. This can be done either in English ([place] or metric measurements. Note that charts and online BMI calculators are available to help you find your BMI quickly and easily without having to do these calculations yourself.  To calculate your BMI in English ([place] measurements:    1. Measure your weight in pounds (lb).  2. Multiply the number of pounds by 703.  ? For example, for a person who weighs 180 lb, multiply that number by 703, which equals 126,540.  3. Measure your height in inches. Then multiply that number by itself to get a measurement called \"inches squared.\"  ? For example, for a person who is 70 inches tall, the \"inches squared\" measurement is 70 inches x 70 inches, which equals 4,900 inches squared.  4. Divide the total from step 2 (number of lb x 703) by the total from step 3 (inches squared): 126,540 ÷ 4,900 = 25.8. This is " "your BMI.  To calculate your BMI in metric measurements:  1. Measure your weight in kilograms (kg).  2. Measure your height in meters (m). Then multiply that number by itself to get a measurement called \"meters squared.\"  ? For example, for a person who is 1.75 m tall, the \"meters squared\" measurement is 1.75 m x 1.75 m, which is equal to 3.1 meters squared.  3. Divide the number of kilograms (your weight) by the meters squared number. In this example: 70 ÷ 3.1 = 22.6. This is your BMI.  What do the results mean?  BMI charts are used to identify whether you are underweight, normal weight, overweight, or obese. The following guidelines will be used:  · Underweight: BMI less than 18.5.  · Normal weight: BMI between 18.5 and 24.9.  · Overweight: BMI between 25 and 29.9.  · Obese: BMI of 30 or above.  Keep these notes in mind:  · Weight includes both fat and muscle, so someone with a muscular build, such as an athlete, may have a BMI that is higher than 24.9. In cases like these, BMI is not an accurate measure of body fat.  · To determine if excess body fat is the cause of a BMI of 25 or higher, further assessments may need to be done by a health care provider.  · BMI is usually interpreted in the same way for men and women.  Where to find more information  For more information about BMI, including tools to quickly calculate your BMI, go to these websites:  · Centers for Disease Control and Prevention: www.cdc.gov  · American Heart Association: www.heart.org  · National Heart, Lung, and Blood [place]: www.nhlbi.nih.gov  Summary  · Body mass index (BMI) is a number that is calculated from a person's weight and height.  · BMI may help estimate how much of a person's weight is composed of fat. BMI can help identify those who may be at higher risk for certain medical problems.  · BMI can be measured using English measurements or metric measurements.  · BMI charts are used to identify whether you are underweight, normal " weight, overweight, or obese.  This information is not intended to replace advice given to you by your health care provider. Make sure you discuss any questions you have with your health care provider.  Document Revised: 09/09/2020 Document Reviewed: 07/17/2020  Elsevier Patient Education © 2021 [employer]

## 2021-12-13 ENCOUNTER — OFFICE VISIT (OUTPATIENT)
Dept: FAMILY MEDICINE CLINIC | Facility: CLINIC | Age: 41
End: 2021-12-13

## 2021-12-13 VITALS
HEIGHT: 73 IN | SYSTOLIC BLOOD PRESSURE: 120 MMHG | BODY MASS INDEX: 26.97 KG/M2 | WEIGHT: 203.5 LBS | DIASTOLIC BLOOD PRESSURE: 80 MMHG

## 2021-12-13 DIAGNOSIS — S61.209A OPEN WOUND OF FINGER, INITIAL ENCOUNTER: ICD-10-CM

## 2021-12-13 DIAGNOSIS — M79.89 SWELLING OF BOTH HANDS: Primary | ICD-10-CM

## 2021-12-13 PROCEDURE — 99212 OFFICE O/P EST SF 10 MIN: CPT | Performed by: FAMILY MEDICINE

## 2021-12-13 NOTE — PROGRESS NOTES
Subjective   Erickson Singh is a 41 y.o. male.  Requesting evaluation of subjective swelling of the hands.  Previous psychiatric history noted.  States woke up this morning both hands were swollen thought they were swollen when he pushed himself up off the bed.  Admits to using a lot of console work and videogame work.  Also has a small what looks like a friction blister on the dorsum of the left ring finger.    History of Present Illness   HPI    The following portions of the patient's history were reviewed and updated as appropriate: allergies, current medications, past family history, past medical history, past social history, past surgical history and problem list.    Review of Systems  Review of Systems   Constitutional: Negative for activity change, appetite change, fatigue and unexpected weight change.   HENT: Negative for trouble swallowing and voice change.    Eyes: Negative for redness and visual disturbance.   Respiratory: Negative for cough and wheezing.    Cardiovascular: Negative for chest pain and palpitations.   Gastrointestinal: Negative for abdominal pain, constipation, diarrhea, nausea and vomiting.   Genitourinary: Negative for urgency.   Musculoskeletal: Positive for arthralgias and myalgias. Negative for joint swelling.   Skin: Positive for wound.   Neurological: Negative for syncope and headaches.   Hematological: Negative for adenopathy.   Psychiatric/Behavioral: Negative for sleep disturbance.       Objective   Physical Exam  Physical Exam  Constitutional:       Appearance: Normal appearance.   Musculoskeletal:      Comments: Both hands show no obvious swelling normal range of motion of all joints.  The above-mentioned wound is a small unroofed what looks like a blister about a centimeter or so in size can be treated locally no redness no streaks.  Normal pulses no other skin changes   Neurological:      Mental Status: He is alert.   Psychiatric:         Mood and Affect: Affect is  "blunt.         Speech: Speech is delayed.         Behavior: Behavior is cooperative.           Visit Vitals  /80   Ht 185.4 cm (73\")   Wt 92.3 kg (203 lb 8 oz)   BMI 26.85 kg/m²     Body mass index is 26.85 kg/m².    /80   Ht 185.4 cm (73\")   Wt 92.3 kg (203 lb 8 oz)   BMI 26.85 kg/m²     Assessment/Plan   Diagnoses and all orders for this visit:    1. Swelling of both hands (Primary)  -     mupirocin (BACTROBAN) 2 % ointment; Apply to affected area twice daily till healed  Dispense: 15 g; Refill: 1    2. Open wound of finger, initial encounter  -     mupirocin (BACTROBAN) 2 % ointment; Apply to affected area twice daily till healed  Dispense: 15 g; Refill: 1    To the last gripping and holding more for hand movement and exercise of them playing video games cool compresses for subjective swelling routine skin care including Bactroban for the wound rechecks direct  "

## 2022-05-23 ENCOUNTER — HOSPITAL ENCOUNTER (EMERGENCY)
Facility: HOSPITAL | Age: 42
Discharge: HOME | End: 2022-05-23
Attending: EMERGENCY MEDICINE | Admitting: EMERGENCY MEDICINE

## 2022-05-23 ENCOUNTER — APPOINTMENT (OUTPATIENT)
Dept: CT IMAGING | Facility: HOSPITAL | Age: 42
End: 2022-05-23

## 2022-05-23 VITALS
DIASTOLIC BLOOD PRESSURE: 71 MMHG | TEMPERATURE: 98.1 F | HEART RATE: 60 BPM | SYSTOLIC BLOOD PRESSURE: 118 MMHG | BODY MASS INDEX: 27.57 KG/M2 | RESPIRATION RATE: 16 BRPM | OXYGEN SATURATION: 98 % | HEIGHT: 73 IN | WEIGHT: 208 LBS

## 2022-05-23 DIAGNOSIS — S06.0X0A CONCUSSION WITHOUT LOSS OF CONSCIOUSNESS, INITIAL ENCOUNTER: ICD-10-CM

## 2022-05-23 DIAGNOSIS — S09.90XA INJURY OF HEAD, INITIAL ENCOUNTER: Primary | ICD-10-CM

## 2022-05-23 PROCEDURE — 99283 EMERGENCY DEPT VISIT LOW MDM: CPT

## 2022-05-23 PROCEDURE — 70450 CT HEAD/BRAIN W/O DYE: CPT

## 2022-05-23 PROCEDURE — 63710000001 ONDANSETRON ODT 4 MG TABLET DISPERSIBLE: Performed by: EMERGENCY MEDICINE

## 2022-05-23 RX ORDER — ONDANSETRON 4 MG/1
4 TABLET, ORALLY DISINTEGRATING ORAL ONCE
Status: COMPLETED | OUTPATIENT
Start: 2022-05-23 | End: 2022-05-23

## 2022-05-23 RX ORDER — ACETAMINOPHEN 325 MG/1
650 TABLET ORAL ONCE
Status: COMPLETED | OUTPATIENT
Start: 2022-05-23 | End: 2022-05-23

## 2022-05-23 RX ADMIN — ONDANSETRON 4 MG: 4 TABLET, ORALLY DISINTEGRATING ORAL at 10:58

## 2022-05-23 RX ADMIN — ACETAMINOPHEN 650 MG: 325 TABLET ORAL at 10:58

## 2022-05-23 NOTE — ED PROVIDER NOTES
Subjective   40yo male pmh significant schizophrenia/bipolar disorder presents ED reported fall from bed striking head on metal chair.  Unknown loc.  Pt reports headache as well as nausea/vomiting x4 episodes.  Denies neck pain/back pain/chest pain/soa/abd pain.      History provided by:  Patient and parent  Head Injury  Mechanism of injury: fall    Associated symptoms: headache, nausea and vomiting        Review of Systems   Constitutional: Negative.    Respiratory: Negative.    Gastrointestinal: Positive for nausea and vomiting. Negative for abdominal pain.   Genitourinary: Negative.    Musculoskeletal: Negative.    Neurological: Positive for headaches.   All other systems reviewed and are negative.      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 A left sided fistula-in-ano with connection to the posterior midline of the anus.   • ANAL FISTULA REPAIR Left 8/13/2021    Procedure: ANAL FISTULOTOMY;  Surgeon: Jorge Luis Timmons MD;  Location: [facility] OR;  Service: General;  Laterality: Left;   • COLONOSCOPY N/A 9/14/2020    Procedure: COLONOSCOPY;  Surgeon: Kati Lin MD;  Location: [facility] ENDOSCOPY;  Service: Gastroenterology;  Laterality: N/A;   • ENDOSCOPY N/A 9/14/2020    Procedure: ESOPHAGOGASTRODUODENOSCOPY;  Surgeon: Kati Lin MD;  Location: [facility] ENDOSCOPY;  Service: Gastroenterology;  Laterality: N/A;   • TONSILLECTOMY AND ADENOIDECTOMY         Family History   Problem  Relation Age of Onset   • Hypertension Other    • Thyroid disease Other    • Cancer Mother    • Cancer Maternal Uncle    • Diabetes Maternal Grandmother    • Stroke Maternal Grandfather        Social History     Socioeconomic History   • Marital status: Single   Tobacco Use   • Smoking status: Current Every Day Smoker     Packs/day: 0.50     Types: Cigarettes   • Smokeless tobacco: Never Used   Vaping Use   • Vaping Use: Never used   Substance and Sexual Activity   • Alcohol use: No   • Drug use: Never   • Sexual activity: Defer           Objective   Physical Exam  Vitals and nursing note reviewed.   Constitutional:       Appearance: Normal appearance.   HENT:      Head: Normocephalic and atraumatic.      Right Ear: Tympanic membrane, ear canal and external ear normal.      Left Ear: Tympanic membrane, ear canal and external ear normal.      Mouth/Throat:      Mouth: Mucous membranes are moist.   Eyes:      Extraocular Movements: Extraocular movements intact.      Pupils: Pupils are equal, round, and reactive to light.   Cardiovascular:      Rate and Rhythm: Normal rate and regular rhythm.      Pulses: Normal pulses.      Heart sounds: Normal heart sounds. No murmur heard.    No friction rub. No gallop.   Pulmonary:      Effort: Pulmonary effort is normal. No respiratory distress.      Breath sounds: Normal breath sounds. No wheezing, rhonchi or rales.   Abdominal:      General: Abdomen is flat. Bowel sounds are normal. There is no distension.      Palpations: Abdomen is soft.      Tenderness: There is no abdominal tenderness. There is no guarding or rebound.   Musculoskeletal:      Cervical back: Normal range of motion and neck supple. No rigidity, tenderness or bony tenderness.      Thoracic back: No tenderness or bony tenderness.      Lumbar back: No tenderness or bony tenderness.   Lymphadenopathy:      Cervical: No cervical adenopathy.   Skin:     General: Skin is warm and dry.   Neurological:      General: No  focal deficit present.      Mental Status: He is alert and oriented to person, place, and time.      GCS: GCS eye subscore is 4. GCS verbal subscore is 5. GCS motor subscore is 6.      Cranial Nerves: Cranial nerves are intact.      Sensory: Sensation is intact.      Motor: Motor function is intact.         Procedures           ED Course      CT Head Without Contrast    Result Date: 5/23/2022  Narrative: PROCEDURE: CT head without contrast REASON FOR EXAM: head injury This exam was performed according to our departmental dose-optimization program, which includes automated exposure control, adjustment of the mA and/or kV according to patient size and/or use of iterative reconstruction technique. FINDINGS: Comparison study dated May 4, 2018. Axial computer tomography sequential imaging of the head was performed from the vertex to the base of the skull. .Sagittal and coronal reformation was performed . The skull vault is intact. Paranasal sinuses and bilateral mastoid air cells are well aerated. Cerebral and cerebellar parenchymal are normal. Ventricular system and subarachnoid spaces are normal.     Impression: Normal CT of the head. Electronically signed by:  Christiano Ponce MD  5/23/2022 1:32 PM CDT Workstation: [id number]         Utilization of CT for Minor Blunt Head Trauma (Adult)      Patient has one or more of the following conditions that are excluded from the measure: None    Patient is 18 or older, presenting with minor blunt head trauma.  Head CT was ordered by an emergency care clinician for trauma because:  Patient is vomiting                                      MDM    Final diagnoses:   Injury of head, initial encounter   Concussion without loss of consciousness, initial encounter       ED Disposition  ED Disposition     ED Disposition   Discharge    Condition   Good    Comment   --             Jatin Tamayo MD  [address] [facility]  [address]  [place] [id number] [phone]    In 2 days            Medication List      No changes were made to your prescriptions during this visit.          Channing Zhou MD  05/23/22 [id number]

## 2022-05-23 NOTE — ED NOTES
"Pt states \"  I rolled out of bed and hit my table and then threw up.\" pt says there is a knot in the back of his head.   "

## 2022-09-21 ENCOUNTER — OFFICE VISIT (OUTPATIENT)
Dept: FAMILY MEDICINE CLINIC | Facility: CLINIC | Age: 42
End: 2022-09-21

## 2022-09-21 VITALS
BODY MASS INDEX: 26.56 KG/M2 | WEIGHT: 196.1 LBS | SYSTOLIC BLOOD PRESSURE: 118 MMHG | DIASTOLIC BLOOD PRESSURE: 74 MMHG | HEIGHT: 72 IN

## 2022-09-21 DIAGNOSIS — F17.210 CIGARETTE NICOTINE DEPENDENCE WITHOUT COMPLICATION: ICD-10-CM

## 2022-09-21 DIAGNOSIS — J32.9 SINUSITIS, UNSPECIFIED CHRONICITY, UNSPECIFIED LOCATION: ICD-10-CM

## 2022-09-21 DIAGNOSIS — J30.9 ALLERGIC RHINITIS, UNSPECIFIED SEASONALITY, UNSPECIFIED TRIGGER: ICD-10-CM

## 2022-09-21 DIAGNOSIS — J30.89 SEASONAL ALLERGIC RHINITIS DUE TO OTHER ALLERGIC TRIGGER: ICD-10-CM

## 2022-09-21 DIAGNOSIS — J40 BRONCHITIS: ICD-10-CM

## 2022-09-21 DIAGNOSIS — J06.9 ACUTE URI: Primary | ICD-10-CM

## 2022-09-21 PROCEDURE — 99214 OFFICE O/P EST MOD 30 MIN: CPT | Performed by: FAMILY MEDICINE

## 2022-09-21 RX ORDER — ALBUTEROL SULFATE 90 UG/1
2 AEROSOL, METERED RESPIRATORY (INHALATION) EVERY 4 HOURS PRN
Qty: 18 G | Refills: 5 | Status: SHIPPED | OUTPATIENT
Start: 2022-09-21 | End: 2022-09-22 | Stop reason: SDUPTHER

## 2022-09-21 RX ORDER — GUAIFENESIN 600 MG/1
TABLET, EXTENDED RELEASE ORAL
Qty: 30 TABLET | Refills: 1 | Status: SHIPPED | OUTPATIENT
Start: 2022-09-21 | End: 2022-09-22 | Stop reason: SDUPTHER

## 2022-09-21 RX ORDER — FLUTICASONE PROPIONATE 50 MCG
1 SPRAY, SUSPENSION (ML) NASAL DAILY
Qty: 15.8 ML | Refills: 5 | Status: SHIPPED | OUTPATIENT
Start: 2022-09-21 | End: 2022-09-22 | Stop reason: SDUPTHER

## 2022-09-21 RX ORDER — DOXYCYCLINE HYCLATE 100 MG/1
CAPSULE ORAL
Qty: 20 CAPSULE | Refills: 0 | Status: SHIPPED | OUTPATIENT
Start: 2022-09-21 | End: 2022-09-22 | Stop reason: SDUPTHER

## 2022-09-21 NOTE — PROGRESS NOTES
Subjective   Erickson Singh is a 42 y.o. male.  Resting valuation of several week history of sinus drainage postnasal drip cough congestion.  Continues him back to smoking unfortuneately.  Seen in urgent care as mentioned.  Given steroid shot.  Still having a lot of congestion drainage and cough.  Enforcer given schizophrenia history is somewhat questionable.  Has had some unbalanced walking related to same.    History of Present Illness   HPI    The following portions of the patient's history were reviewed and updated as appropriate: allergies, current medications, past family history, past medical history, past social history, past surgical history and problem list.    Review of Systems  Review of Systems   Constitutional: Negative for activity change, appetite change, fatigue and unexpected weight change.   HENT: Positive for congestion, sinus pressure and sinus pain. Negative for trouble swallowing and voice change.    Eyes: Negative for redness and visual disturbance.   Respiratory: Positive for cough. Negative for wheezing.    Cardiovascular: Negative for chest pain and palpitations.   Gastrointestinal: Negative for abdominal pain, constipation, diarrhea, nausea and vomiting.   Genitourinary: Negative for urgency.   Musculoskeletal: Negative for joint swelling.   Neurological: Positive for dizziness and headaches. Negative for syncope.   Hematological: Negative for adenopathy.   Psychiatric/Behavioral: Negative for sleep disturbance.       Objective   Physical Exam  Physical Exam  Constitutional:       Appearance: He is well-developed.   HENT:      Head: Normocephalic.      Right Ear: External ear normal. Tympanic membrane is retracted.      Left Ear: Tympanic membrane is retracted.      Nose: Mucosal edema present.      Comments: Mild postnasal drip  Eyes:      Pupils: Pupils are equal, round, and reactive to light.   Neck:      Thyroid: No thyromegaly.   Cardiovascular:      Rate and Rhythm:  "Normal rate and regular rhythm.      Heart sounds: Normal heart sounds. No murmur heard.    No friction rub. No gallop.   Pulmonary:      Breath sounds: Examination of the right-lower field reveals rhonchi. Examination of the left-lower field reveals rhonchi. Rhonchi present.      Comments: Cannot rhonchi bases normal rate and phase  Abdominal:      General: There is no distension.      Palpations: Abdomen is soft. There is no mass.      Tenderness: There is no abdominal tenderness.   Musculoskeletal:         General: Normal range of motion.      Cervical back: Normal range of motion.   Skin:     General: Skin is warm and dry.   Neurological:      Mental Status: He is alert and oriented to person, place, and time.      Deep Tendon Reflexes: Reflexes are normal and symmetric.   Psychiatric:         Mood and Affect: Affect is blunt.         Speech: Speech is delayed.         Behavior: Behavior is slowed.           Visit Vitals  /74   Ht 182.9 cm (72\")   Wt 89 kg (196 lb 1.6 oz)   BMI 26.60 kg/m²     Body mass index is 26.6 kg/m².    /74   Ht 182.9 cm (72\")   Wt 89 kg (196 lb 1.6 oz)   BMI 26.60 kg/m²     Assessment/Plan   Diagnoses and all orders for this visit:    1. Acute URI (Primary)  -     albuterol sulfate HFA (Proventil HFA) 108 (90 Base) MCG/ACT inhaler; Inhale 2 puffs Every 4 (Four) Hours As Needed for Wheezing.  Dispense: 18 g; Refill: 5    2. Cigarette nicotine dependence without complication    3. Sinusitis, unspecified chronicity, unspecified location  -     doxycycline (Vibramycin) 100 MG capsule; 1 bid with food x 10 days till gone  Dispense: 20 capsule; Refill: 0  -     guaiFENesin (Mucinex) 600 MG 12 hr tablet; 1 twice daily as needed congestion  Dispense: 30 tablet; Refill: 1    4. Bronchitis  -     albuterol sulfate HFA (Proventil HFA) 108 (90 Base) MCG/ACT inhaler; Inhale 2 puffs Every 4 (Four) Hours As Needed for Wheezing.  Dispense: 18 g; Refill: 5  -     doxycycline (Vibramycin) " 100 MG capsule; 1 bid with food x 10 days till gone  Dispense: 20 capsule; Refill: 0  -     guaiFENesin (Mucinex) 600 MG 12 hr tablet; 1 twice daily as needed congestion  Dispense: 30 tablet; Refill: 1    5. Seasonal allergic rhinitis due to other allergic trigger    6. Allergic rhinitis, unspecified seasonality, unspecified trigger  -     fluticasone (FLONASE) 50 MCG/ACT nasal spray; 1 spray into the nostril(s) as directed by provider Daily.  Dispense: 15.8 mL; Refill: 5     on stopping smoking.  3-10m     Counseled strongly on lifestyle measures including [place] pot use Flonase use every day albuterol use environmental control measures.  Counseled on flu vaccine after this is over.  Lifestyle measures discussed rechecks direct

## 2022-09-22 ENCOUNTER — TELEPHONE (OUTPATIENT)
Dept: FAMILY MEDICINE CLINIC | Facility: CLINIC | Age: 42
End: 2022-09-22

## 2022-09-22 DIAGNOSIS — J40 BRONCHITIS: ICD-10-CM

## 2022-09-22 DIAGNOSIS — J06.9 ACUTE URI: ICD-10-CM

## 2022-09-22 DIAGNOSIS — J32.9 SINUSITIS, UNSPECIFIED CHRONICITY, UNSPECIFIED LOCATION: ICD-10-CM

## 2022-09-22 DIAGNOSIS — J30.9 ALLERGIC RHINITIS, UNSPECIFIED SEASONALITY, UNSPECIFIED TRIGGER: ICD-10-CM

## 2022-09-22 RX ORDER — FLUTICASONE PROPIONATE 50 MCG
1 SPRAY, SUSPENSION (ML) NASAL DAILY
Qty: 15.8 ML | Refills: 5 | Status: SHIPPED | OUTPATIENT
Start: 2022-09-22

## 2022-09-22 RX ORDER — DOXYCYCLINE HYCLATE 100 MG/1
CAPSULE ORAL
Qty: 20 CAPSULE | Refills: 0 | Status: SHIPPED | OUTPATIENT
Start: 2022-09-22

## 2022-09-22 RX ORDER — GUAIFENESIN 600 MG/1
TABLET, EXTENDED RELEASE ORAL
Qty: 30 TABLET | Refills: 1 | Status: SHIPPED | OUTPATIENT
Start: 2022-09-22

## 2022-09-22 RX ORDER — ALBUTEROL SULFATE 90 UG/1
2 AEROSOL, METERED RESPIRATORY (INHALATION) EVERY 4 HOURS PRN
Qty: 18 G | Refills: 5 | Status: SHIPPED | OUTPATIENT
Start: 2022-09-22

## 2022-09-22 NOTE — TELEPHONE ENCOUNTER
Please call patient. VM left that he is needing to transfer all scripts to [facility] by his mother Mrs. Wiggins. Tried to contact to verify, no answer. Thanks!

## (undated) DEVICE — PAD,ABDOMINAL,8"X10",ST,LF: Brand: MEDLINE

## (undated) DEVICE — SUT VIC 3/0 TIES 18IN J110T

## (undated) DEVICE — ANTIBACTERIAL UNDYED BRAIDED (POLYGLACTIN 910), SYNTHETIC ABSORBABLE SUTURE: Brand: COATED VICRYL

## (undated) DEVICE — BITEBLOCK ENDO W/STRAP 60F A/ LF DISP

## (undated) DEVICE — HARMONIC ACE +7 SHEARS ADVANCED HEMOSTASIS 5MM DIAMETER 23CM SHAFT LENGTH  FOR USE WITH GRAY HAND PIECE ONLY: Brand: HARMONIC ACE

## (undated) DEVICE — INTENDED TO BE USED TO OCCLUDE, RETRACT AND IDENTIFY ARTERIES, VEINS, TENDONS AND NERVES IN SURGICAL PROCEDURES: Brand: STERION®  VESSEL LOOP

## (undated) DEVICE — ADHS LIQ MASTISOL 2/3ML

## (undated) DEVICE — PK MAJ PROC LF 60

## (undated) DEVICE — SUT PROLN 2/0 SH 36IN 8523H

## (undated) DEVICE — CLTH CLENS READYCLEANSE PERI CARE PK/5

## (undated) DEVICE — PENCL E/S ULTRAVAC TELESCP NOSE HOLSTR 10FT

## (undated) DEVICE — PREP SOL POVIDONE/IODINE BT 4OZ

## (undated) DEVICE — STERILE POLYISOPRENE POWDER-FREE SURGICAL GLOVES WITH EMOLLIENT COATING: Brand: PROTEXIS

## (undated) DEVICE — GLV SURG SIGNATURE ESSENTIAL PF LTX SZ6.5

## (undated) DEVICE — GLV SURG TRIUMPH PF LTX 6.5 STRL

## (undated) DEVICE — ADAPT LUER STUB INTRAMEDIC PE/160/190 18G CA/1000

## (undated) DEVICE — SINGLE-USE BIOPSY FORCEPS: Brand: RADIAL JAW 4

## (undated) DEVICE — SOL IRR NACL 0.9PCT BT 1000ML

## (undated) DEVICE — LUBE JELLY SURGILUBE TB/FLIPCAP 4.5OZ

## (undated) DEVICE — SUT VIC 2/0 SH 27IN

## (undated) DEVICE — PREP PVP-I 7.5P BT 4OZ

## (undated) DEVICE — PATIENT RETURN ELECTRODE, SINGLE-USE, CONTACT QUALITY MONITORING, ADULT, WITH 9FT CORD, FOR PATIENTS WEIGING OVER 33LBS. (15KG): Brand: MEGADYNE

## (undated) DEVICE — PENCL ES MEGADINE EZ/CLEAN BUTN W/HOLSTR 10FT

## (undated) DEVICE — GAUZE,PACKING STRIP,IODOFORM,1"X5YD,STRL: Brand: CURAD